# Patient Record
Sex: MALE | Race: WHITE | Employment: OTHER | URBAN - METROPOLITAN AREA
[De-identification: names, ages, dates, MRNs, and addresses within clinical notes are randomized per-mention and may not be internally consistent; named-entity substitution may affect disease eponyms.]

---

## 2019-02-01 ENCOUNTER — APPOINTMENT (OUTPATIENT)
Dept: GENERAL RADIOLOGY | Age: 69
End: 2019-02-01
Payer: MEDICARE

## 2019-02-01 ENCOUNTER — TELEPHONE (OUTPATIENT)
Dept: ORTHOPEDIC SURGERY | Age: 69
End: 2019-02-01

## 2019-02-01 ENCOUNTER — HOSPITAL ENCOUNTER (EMERGENCY)
Age: 69
Discharge: HOME | End: 2019-02-01
Attending: EMERGENCY MEDICINE
Payer: MEDICARE

## 2019-02-01 VITALS
HEART RATE: 81 BPM | BODY MASS INDEX: 25.77 KG/M2 | OXYGEN SATURATION: 96 % | RESPIRATION RATE: 14 BRPM | DIASTOLIC BLOOD PRESSURE: 78 MMHG | HEIGHT: 70 IN | WEIGHT: 180 LBS | TEMPERATURE: 97.7 F | SYSTOLIC BLOOD PRESSURE: 143 MMHG

## 2019-02-01 DIAGNOSIS — S52.501A CLOSED FRACTURE OF DISTAL ENDS OF RIGHT RADIUS AND ULNA, INITIAL ENCOUNTER: Primary | ICD-10-CM

## 2019-02-01 DIAGNOSIS — S52.601A CLOSED FRACTURE OF DISTAL ENDS OF RIGHT RADIUS AND ULNA, INITIAL ENCOUNTER: Primary | ICD-10-CM

## 2019-02-01 PROCEDURE — 99284 EMERGENCY DEPT VISIT MOD MDM: CPT

## 2019-02-01 PROCEDURE — 6360000002 HC RX W HCPCS

## 2019-02-01 PROCEDURE — 96372 THER/PROPH/DIAG INJ SC/IM: CPT

## 2019-02-01 PROCEDURE — 73130 X-RAY EXAM OF HAND: CPT

## 2019-02-01 PROCEDURE — 73100 X-RAY EXAM OF WRIST: CPT

## 2019-02-01 PROCEDURE — 25605 CLTX DST RDL FX/EPHYS SEP W/: CPT

## 2019-02-01 PROCEDURE — 73110 X-RAY EXAM OF WRIST: CPT

## 2019-02-01 PROCEDURE — 2500000003 HC RX 250 WO HCPCS: Performed by: STUDENT IN AN ORGANIZED HEALTH CARE EDUCATION/TRAINING PROGRAM

## 2019-02-01 RX ORDER — LIDOCAINE HYDROCHLORIDE 10 MG/ML
20 INJECTION, SOLUTION EPIDURAL; INFILTRATION; INTRACAUDAL; PERINEURAL ONCE
Status: COMPLETED | OUTPATIENT
Start: 2019-02-01 | End: 2019-02-01

## 2019-02-01 RX ORDER — LIDOCAINE HYDROCHLORIDE AND EPINEPHRINE 10; 10 MG/ML; UG/ML
INJECTION, SOLUTION INFILTRATION; PERINEURAL
Status: DISCONTINUED
Start: 2019-02-01 | End: 2019-02-01 | Stop reason: HOSPADM

## 2019-02-01 RX ORDER — FENTANYL CITRATE 50 UG/ML
INJECTION, SOLUTION INTRAMUSCULAR; INTRAVENOUS
Status: COMPLETED
Start: 2019-02-01 | End: 2019-02-01

## 2019-02-01 RX ORDER — LIDOCAINE HYDROCHLORIDE 10 MG/ML
INJECTION, SOLUTION INFILTRATION; PERINEURAL
Status: DISCONTINUED
Start: 2019-02-01 | End: 2019-02-01 | Stop reason: HOSPADM

## 2019-02-01 RX ORDER — FENTANYL CITRATE 50 UG/ML
100 INJECTION, SOLUTION INTRAMUSCULAR; INTRAVENOUS ONCE
Status: COMPLETED | OUTPATIENT
Start: 2019-02-01 | End: 2019-02-01

## 2019-02-01 RX ORDER — HYDROCODONE BITARTRATE AND ACETAMINOPHEN 5; 325 MG/1; MG/1
1 TABLET ORAL EVERY 6 HOURS PRN
Qty: 12 TABLET | Refills: 0 | Status: SHIPPED | OUTPATIENT
Start: 2019-02-01 | End: 2019-02-04

## 2019-02-01 RX ADMIN — FENTANYL CITRATE 100 MCG: 50 INJECTION, SOLUTION INTRAMUSCULAR; INTRAVENOUS at 12:28

## 2019-02-01 RX ADMIN — LIDOCAINE HYDROCHLORIDE 10 ML: 10 INJECTION, SOLUTION EPIDURAL; INFILTRATION; INTRACAUDAL; PERINEURAL at 12:22

## 2019-02-01 ASSESSMENT — PAIN SCALES - GENERAL
PAINLEVEL_OUTOF10: 10
PAINLEVEL_OUTOF10: 10

## 2019-02-01 ASSESSMENT — PAIN DESCRIPTION - ORIENTATION: ORIENTATION: RIGHT

## 2019-02-01 ASSESSMENT — PAIN DESCRIPTION - PAIN TYPE: TYPE: ACUTE PAIN

## 2019-02-01 ASSESSMENT — PAIN DESCRIPTION - LOCATION: LOCATION: ARM;WRIST

## 2019-06-21 ENCOUNTER — HOSPITAL ENCOUNTER (EMERGENCY)
Age: 69
Discharge: HOME | End: 2019-06-21
Attending: EMERGENCY MEDICINE
Payer: MEDICARE

## 2019-06-21 VITALS
HEART RATE: 75 BPM | HEIGHT: 69 IN | OXYGEN SATURATION: 99 % | WEIGHT: 178 LBS | DIASTOLIC BLOOD PRESSURE: 79 MMHG | TEMPERATURE: 98 F | RESPIRATION RATE: 16 BRPM | SYSTOLIC BLOOD PRESSURE: 135 MMHG | BODY MASS INDEX: 26.36 KG/M2

## 2019-06-21 DIAGNOSIS — M54.16 LUMBAR RADICULOPATHY, RIGHT: Primary | ICD-10-CM

## 2019-06-21 PROCEDURE — 6360000002 HC RX W HCPCS: Performed by: EMERGENCY MEDICINE

## 2019-06-21 PROCEDURE — 96374 THER/PROPH/DIAG INJ IV PUSH: CPT

## 2019-06-21 PROCEDURE — 99283 EMERGENCY DEPT VISIT LOW MDM: CPT

## 2019-06-21 PROCEDURE — 6370000000 HC RX 637 (ALT 250 FOR IP): Performed by: EMERGENCY MEDICINE

## 2019-06-21 PROCEDURE — 96375 TX/PRO/DX INJ NEW DRUG ADDON: CPT

## 2019-06-21 RX ORDER — BACLOFEN 10 MG/1
TABLET ORAL
Qty: 30 TABLET | Refills: 0 | Status: SHIPPED | OUTPATIENT
Start: 2019-06-21 | End: 2019-09-03 | Stop reason: ALTCHOICE

## 2019-06-21 RX ORDER — HYDROCODONE BITARTRATE AND ACETAMINOPHEN 5; 325 MG/1; MG/1
1 TABLET ORAL EVERY 6 HOURS PRN
Qty: 12 TABLET | Refills: 0 | Status: SHIPPED | OUTPATIENT
Start: 2019-06-21 | End: 2019-06-24

## 2019-06-21 RX ORDER — MORPHINE SULFATE 4 MG/ML
6 INJECTION, SOLUTION INTRAMUSCULAR; INTRAVENOUS ONCE
Status: COMPLETED | OUTPATIENT
Start: 2019-06-21 | End: 2019-06-21

## 2019-06-21 RX ORDER — MORPHINE SULFATE 4 MG/ML
6 INJECTION, SOLUTION INTRAMUSCULAR; INTRAVENOUS ONCE
Status: DISCONTINUED | OUTPATIENT
Start: 2019-06-21 | End: 2019-06-21

## 2019-06-21 RX ORDER — ONDANSETRON 4 MG/1
4 TABLET, ORALLY DISINTEGRATING ORAL ONCE
Status: COMPLETED | OUTPATIENT
Start: 2019-06-21 | End: 2019-06-21

## 2019-06-21 RX ORDER — KETOROLAC TROMETHAMINE 30 MG/ML
30 INJECTION, SOLUTION INTRAMUSCULAR; INTRAVENOUS ONCE
Status: COMPLETED | OUTPATIENT
Start: 2019-06-21 | End: 2019-06-21

## 2019-06-21 RX ORDER — DIAZEPAM 5 MG/1
5 TABLET ORAL ONCE
Status: COMPLETED | OUTPATIENT
Start: 2019-06-21 | End: 2019-06-21

## 2019-06-21 RX ADMIN — DIAZEPAM 5 MG: 5 TABLET ORAL at 04:53

## 2019-06-21 RX ADMIN — MORPHINE SULFATE 6 MG: 4 INJECTION, SOLUTION INTRAMUSCULAR; INTRAVENOUS at 04:54

## 2019-06-21 RX ADMIN — KETOROLAC TROMETHAMINE 30 MG: 30 INJECTION, SOLUTION INTRAMUSCULAR at 04:53

## 2019-06-21 RX ADMIN — ONDANSETRON 4 MG: 4 TABLET, ORALLY DISINTEGRATING ORAL at 04:53

## 2019-06-21 ASSESSMENT — PAIN DESCRIPTION - DESCRIPTORS: DESCRIPTORS: CONSTANT

## 2019-06-21 ASSESSMENT — PAIN SCALES - GENERAL
PAINLEVEL_OUTOF10: 10

## 2019-06-21 ASSESSMENT — PAIN DESCRIPTION - LOCATION: LOCATION: BACK

## 2019-06-21 ASSESSMENT — PAIN DESCRIPTION - PAIN TYPE: TYPE: ACUTE PAIN

## 2019-06-21 NOTE — ED PROVIDER NOTES
HPI:  6/21/19,   Time: 4:36 AM         Marc Mendiola is a 79 y.o. male presenting to the ED for right sided low back pain after bending over while mowing the lawn, beginning 11 days ago. The complaint has been constant, moderate in severity, and worsened by changing position. And movement of back. Also has mild pain going down the right leg to the knee. No saddle anesthesia and no bowel or bladder incontinence or retention and no fever or chills and no recent instrumentation or procedures    ROS:   Pertinent positives and negatives are stated within HPI, all other systems reviewed and are negative.  --------------------------------------------- PAST HISTORY ---------------------------------------------  Past Medical History:  has a past medical history of Anxiety, Hyperlipidemia, and Nephrolithiasis. Past Surgical History:  has no past surgical history on file. Social History:  reports that he has never smoked. He has never used smokeless tobacco. He reports that he does not drink alcohol. Family History: family history includes Cancer in his mother; Hypertension in his mother. The patients home medications have been reviewed. Allergies: Patient has no known allergies. -------------------------------------------------- RESULTS -------------------------------------------------  All laboratory and radiology results have been personally reviewed by myself   LABS:  No results found for this visit on 06/21/19. RADIOLOGY:  Interpreted by Radiologist.  No orders to display       ------------------------- NURSING NOTES AND VITALS REVIEWED ---------------------------   The nursing notes within the ED encounter and vital signs as below have been reviewed.    Pulse 78   Temp 98 °F (36.7 °C) (Oral)   Resp 16   Ht 5' 9\" (1.753 m)   Wt 178 lb (80.7 kg)   SpO2 99%   BMI 26.29 kg/m²   Oxygen Saturation Interpretation: Normal      ---------------------------------------------------PHYSICAL EXAM--------------------------------------      Constitutional/General: Alert and oriented x3, well appearing, non toxic in mild distress secondary to pain  Head: NC/AT  Eyes: PERRL, EOMI  Mouth: Oropharynx clear, handling secretions, no trismus  Neck: Supple, full ROM, no meningeal signs  Pulmonary: Lungs clear to auscultation bilaterally, no wheezes, rales, or rhonchi. Not in respiratory distress  Cardiovascular:  Regular rate and rhythm, no murmurs, gallops, or rubs. 2+ distal pulses  Abdomen: Soft, non tender, non distended,   Extremities: Moves all extremities x 4. Warm and well perfused  Skin: warm and dry without rash  Neurologic: GCS 15,  Psych: Normal Affect  Low back: Moderate LS paraspinal tenderness to palpation on the right but no clear spasm or deformity and no tenderness over the spinous processes    ------------------------------ ED COURSE/MEDICAL DECISION MAKING----------------------  Medications   ketorolac (TORADOL) injection 30 mg (has no administration in time range)   ondansetron (ZOFRAN-ODT) disintegrating tablet 4 mg (has no administration in time range)   diazepam (VALIUM) tablet 5 mg (has no administration in time range)   morphine sulfate (PF) injection 6 mg (has no administration in time range)         Medical Decision Making:    Suspected lumbar radiculopathy    Counseling: The emergency provider has spoken with the patient and discussed todays results, in addition to providing specific details for the plan of care and counseling regarding the diagnosis and prognosis. Questions are answered at this time and they are agreeable with the plan.      --------------------------------- IMPRESSION AND DISPOSITION ---------------------------------    IMPRESSION  1.  Lumbar radiculopathy, right Inadequately Controlled       DISPOSITION  Disposition: Discharge to home  Patient condition is stable                  Dread Batista MD  06/21/19 [id number]

## 2019-06-21 NOTE — ED NOTES
Pts and wife asking about Celebrex and Voltaren gel. Dr. Walker Araseli updated.      Norma Fraga, DEREK  06/21/19 [id number]

## 2021-02-28 ENCOUNTER — IMMUNIZATION (OUTPATIENT)
Dept: PRIMARY CARE CLINIC | Age: 71
End: 2021-02-28
Payer: MEDICARE

## 2021-02-28 PROCEDURE — 0001A COVID-19, PFIZER VACCINE 30MCG/0.3ML DOSE: CPT | Performed by: PHYSICIAN ASSISTANT

## 2021-02-28 PROCEDURE — 91300 COVID-19, PFIZER VACCINE 30MCG/0.3ML DOSE: CPT | Performed by: PHYSICIAN ASSISTANT

## 2021-03-24 ENCOUNTER — IMMUNIZATION (OUTPATIENT)
Dept: PRIMARY CARE CLINIC | Age: 71
End: 2021-03-24
Payer: MEDICARE

## 2021-03-24 PROCEDURE — 0002A PR IMM ADMN SARSCOV2 30MCG/0.3ML DIL RECON 2ND DOSE: CPT | Performed by: INTERNAL MEDICINE

## 2021-03-24 PROCEDURE — 91300 COVID-19, PFIZER VACCINE 30MCG/0.3ML DOSE: CPT | Performed by: INTERNAL MEDICINE

## 2021-07-06 ENCOUNTER — HOSPITAL ENCOUNTER (OUTPATIENT)
Age: 71
Discharge: HOME | End: 2021-07-08

## 2021-07-06 PROCEDURE — 88305 TISSUE EXAM BY PATHOLOGIST: CPT

## 2023-05-09 ENCOUNTER — HOSPITAL ENCOUNTER (OUTPATIENT)
Age: 73
Discharge: HOME | End: 2023-05-11

## 2023-06-20 ENCOUNTER — HOSPITAL ENCOUNTER (EMERGENCY)
Age: 73
Discharge: HOME | End: 2023-06-20
Attending: EMERGENCY MEDICINE
Payer: MEDICARE

## 2023-06-20 VITALS
SYSTOLIC BLOOD PRESSURE: 128 MMHG | HEART RATE: 80 BPM | HEIGHT: 67 IN | WEIGHT: 180 LBS | BODY MASS INDEX: 28.25 KG/M2 | RESPIRATION RATE: 18 BRPM | TEMPERATURE: 98.1 F | OXYGEN SATURATION: 99 % | DIASTOLIC BLOOD PRESSURE: 82 MMHG

## 2023-06-20 DIAGNOSIS — R33.9 URINARY RETENTION: Primary | ICD-10-CM

## 2023-06-20 DIAGNOSIS — N35.911 STRICTURE OF URETHRAL MEATUS IN MALE, UNSPECIFIED STRICTURE TYPE: ICD-10-CM

## 2023-06-20 LAB
BACTERIA URNS QL MICRO: ABNORMAL /HPF
BILIRUB UR QL STRIP: NEGATIVE
CLARITY UR: CLEAR
COLOR UR: YELLOW
GLUCOSE UR STRIP-MCNC: NEGATIVE MG/DL
HGB UR QL STRIP: ABNORMAL
KETONES UR STRIP-MCNC: NEGATIVE MG/DL
LEUKOCYTE ESTERASE UR QL STRIP: ABNORMAL
MUCOUS THREADS URNS QL MICRO: PRESENT /LPF
NITRITE UR QL STRIP: NEGATIVE
PH UR STRIP: 6.5 [PH] (ref 5–9)
PROT UR STRIP-MCNC: NEGATIVE MG/DL
RBC #/AREA URNS HPF: ABNORMAL /HPF (ref 0–2)
SP GR UR STRIP: 1.02 (ref 1–1.03)
UROBILINOGEN UR STRIP-ACNC: 0.2 E.U./DL
WBC #/AREA URNS HPF: ABNORMAL /HPF (ref 0–5)

## 2023-06-20 PROCEDURE — 6370000000 HC RX 637 (ALT 250 FOR IP): Performed by: NURSE PRACTITIONER

## 2023-06-20 PROCEDURE — 81001 URINALYSIS AUTO W/SCOPE: CPT

## 2023-06-20 PROCEDURE — 51702 INSERT TEMP BLADDER CATH: CPT

## 2023-06-20 PROCEDURE — 87088 URINE BACTERIA CULTURE: CPT

## 2023-06-20 PROCEDURE — 99283 EMERGENCY DEPT VISIT LOW MDM: CPT

## 2023-06-20 RX ORDER — LIDOCAINE HYDROCHLORIDE 20 MG/ML
JELLY TOPICAL PRN
Status: DISCONTINUED | OUTPATIENT
Start: 2023-06-20 | End: 2023-06-20 | Stop reason: HOSPADM

## 2023-06-20 RX ADMIN — LIDOCAINE HYDROCHLORIDE: 20 JELLY TOPICAL at 17:47

## 2023-06-20 ASSESSMENT — PAIN - FUNCTIONAL ASSESSMENT
PAIN_FUNCTIONAL_ASSESSMENT: 0-10
PAIN_FUNCTIONAL_ASSESSMENT: 0-10

## 2023-06-20 ASSESSMENT — PAIN DESCRIPTION - FREQUENCY: FREQUENCY: CONTINUOUS

## 2023-06-20 ASSESSMENT — PAIN SCALES - GENERAL
PAINLEVEL_OUTOF10: 5
PAINLEVEL_OUTOF10: 6

## 2023-06-20 ASSESSMENT — PAIN DESCRIPTION - LOCATION
LOCATION: ABDOMEN
LOCATION: PENIS

## 2023-06-20 ASSESSMENT — LIFESTYLE VARIABLES
HOW OFTEN DO YOU HAVE A DRINK CONTAINING ALCOHOL: NEVER
HOW MANY STANDARD DRINKS CONTAINING ALCOHOL DO YOU HAVE ON A TYPICAL DAY: PATIENT DOES NOT DRINK

## 2023-06-20 ASSESSMENT — PAIN DESCRIPTION - ORIENTATION: ORIENTATION: LOWER

## 2023-06-20 ASSESSMENT — PAIN DESCRIPTION - PAIN TYPE: TYPE: ACUTE PAIN

## 2023-06-20 ASSESSMENT — PAIN DESCRIPTION - DESCRIPTORS
DESCRIPTORS: ACHING
DESCRIPTORS: ACHING;BURNING

## 2023-06-20 NOTE — ED NOTES
Resident attempted to insert kendrick unsuccessful d/t stricture at the meatus, urology notified     Rosalino Oscar RN  06/20/23 0413

## 2023-06-20 NOTE — ED NOTES
Patient provided urine sample, states he had a turp done 6 weeks ago with Dr. Yumi Swift and has been having issues fully emptying bladder since. Now stream seems to be \"weird\" as well.       Chi Fox RN  06/20/23 [id number]

## 2023-06-20 NOTE — CONSULTS
6/20/2023 5:07 PM  Miguel Ángel Martin  [id number]     Chief Complaint:    Urinary retention, inability to place kendrick catheter      History of Present Illness: The patient is a 81 y.o. male patient who presented to the hospital with complaints of urinary retention and weak stream. Attempts to place a kendrick catheter in the ER were unsuccessful. He underwent a TURP on 5/9/23 with Dr. Alicia Mcallister. At the time of the TURP he was found to have very low grade, low volume prostate cancer which is being monitored. He states when he is urinating he feels there is an obstruction in his urethra and sometimes has to \"milk\" the urine out. Past Medical History:   Diagnosis Date    Anxiety     Hyperlipidemia     Nephrolithiasis          No past surgical history on file. Medications Prior to Admission:    Not in a hospital admission. Allergies:    Patient has no known allergies. Social History:    reports that he has never smoked. He has never used smokeless tobacco. He reports that he does not drink alcohol. Family History:   Non-contributory to this Urological problem  family history includes Cancer in his mother; Hypertension in his mother. Review of Systems:  Constitutional: No fever or chills   Respiratory: negative for cough and hemoptysis  Cardiovascular: negative for chest pain and dyspnea  Gastrointestinal: negative for abdominal pain, diarrhea, nausea and vomiting   Derm: negative for rash and skin lesion(s)  Neurological: negative for seizures and tremors  Musculoskeletal: Negative    Psychiatric: Negative   : As above in the HPI, otherwise negative  All other reviews are negative    Physical Exam:     Vitals:  BP (!) 145/91   Pulse 85   Temp 98.1 °F (36.7 °C) (Oral)   Resp 18   Ht 5' 7\" (1.702 m)   Wt 180 lb (81.6 kg)   SpO2 99%   BMI 28.19 kg/m²     General:  Awake, alert, oriented X 3. No apparent distress. HEENT:  Normocephalic, atraumatic.   Lungs:  Respirations symmetric and

## 2023-06-20 NOTE — ED NOTES
Attempted to apply leg bag patient refused. Instructed patient/wife on kendrick cath care, addressed all questions. Patient to follow up with urology in 7 days.       Cynthia Nick RN  06/20/23 1588

## 2023-06-20 NOTE — ED PROVIDER NOTES
[facility] ENCOUNTER        Pt Name: Audrea Mcburney  MRN: [id number]  [place] 1950  Date of evaluation: 6/20/2023  Provider: Carloz Bah DO  PCP: Angel Olmstead MD  Note Started: 3:13 PM EDT 6/20/23    CHIEF COMPLAINT       Chief Complaint   Patient presents with    Urinary Retention     Pt states unable to urinate for about 4 hours now, pt states feels like he has to go and tries but nothing happens        HISTORY OF PRESENT ILLNESS: 1 or more Elements   History From: Patient    Limitations to history : None    Audrea Mcburney is a 81 y.o. male who presents to the emergency department with chief complaint of urinary retention. Patient states that he had a transurethral resection of the prostate with Dr. Rosaura Roth approximately 6 weeks ago for BPH and since then he has been urinating more but he has been having some dysuria. He states that over the past several hours he has been having the urge to urinate but has been having a difficult time doing so. He states that he called the urologist office and they stated that there was no one in the office to see him today and that they could see him tomorrow and that if he was comfortable with waiting until and that he should come to the emergency department for evaluation. Patient states that he has been able to pee he just feels that there is a sensation of obstruction in his urethra. Otherwise patient denies any fever, chills, nausea, vomiting, chest pain, shortness of breath, abdominal pain, hematuria, constipation or diarrhea. Nursing Notes were all reviewed and agreed with or any disagreements were addressed in the HPI. REVIEW OF SYSTEMS :      Positives and Pertinent negatives as per HPI. PAST MEDICAL HISTORY/Chronic Conditions Affecting Care      has a past medical history of Anxiety, Hyperlipidemia, and Nephrolithiasis.      SURGICAL HISTORY   No past surgical

## 2023-06-23 LAB — BACTERIA UR CULT: NORMAL

## 2023-07-24 LAB
ANION GAP IN SER/PLAS: 13 MMOL/L (ref 10–20)
CALCIUM (MG/DL) IN SER/PLAS: 9.1 MG/DL (ref 8.6–10.3)
CARBON DIOXIDE, TOTAL (MMOL/L) IN SER/PLAS: 27 MMOL/L (ref 21–32)
CHLORIDE (MMOL/L) IN SER/PLAS: 102 MMOL/L (ref 98–107)
CREATININE (MG/DL) IN SER/PLAS: 0.8 MG/DL (ref 0.5–1.3)
GFR MALE: >90 ML/MIN/1.73M2
GLUCOSE (MG/DL) IN SER/PLAS: 93 MG/DL (ref 74–99)
POTASSIUM (MMOL/L) IN SER/PLAS: 4 MMOL/L (ref 3.5–5.3)
SODIUM (MMOL/L) IN SER/PLAS: 138 MMOL/L (ref 136–145)
UREA NITROGEN (MG/DL) IN SER/PLAS: 10 MG/DL (ref 6–23)

## 2023-09-14 ENCOUNTER — HOSPITAL ENCOUNTER (OUTPATIENT)
Dept: DATA CONVERSION | Facility: HOSPITAL | Age: 73
End: 2023-09-14
Attending: STUDENT IN AN ORGANIZED HEALTH CARE EDUCATION/TRAINING PROGRAM | Admitting: STUDENT IN AN ORGANIZED HEALTH CARE EDUCATION/TRAINING PROGRAM

## 2023-09-14 DIAGNOSIS — N35.919 UNSPECIFIED URETHRAL STRICTURE, MALE, UNSPECIFIED SITE: ICD-10-CM

## 2023-09-14 DIAGNOSIS — N35.914 UNSPECIFIED ANTERIOR URETHRAL STRICTURE, MALE: ICD-10-CM

## 2023-09-29 VITALS — WEIGHT: 156.53 LBS | HEIGHT: 70 IN | BODY MASS INDEX: 22.41 KG/M2

## 2023-09-30 NOTE — H&P
History & Physical Reviewed:   I have reviewed the History and Physical dated:  14-Sep-2023   History and Physical reviewed and relevant findings noted. Patient examined to review pertinent physical  findings.: No significant changes   Home Medications Reviewed: no changes noted   Allergies Reviewed: no changes noted       ERAS (Enhanced Recovery After Surgery):  ·  ERAS Patient: no     Consent:   COVID-19 Consent:  ·  COVID-19 Risk Consent Surgeon has reviewed key risks related to the risk of magda COVID-19 and if they contract COVID-19 what the risks are.     Attestation:   Note Completion:  I am a:  Resident/Fellow   Attending Attestation I saw and evaluated the patient.  I personally obtained the key and critical portions of the history and physical exam or was physically present for key and  critical portions performed by the resident/fellow. I reviewed the resident/fellow?s documentation and discussed the patient with the resident/fellow.  I agree with the resident/fellow?s medical decision making as documented in the note.     I personally evaluated the patient on 14-Sep-2023         Electronic Signatures:  Gus Orozco (Resident))  (Signed 14-Sep-2023 06:42)   Authored: History & Physical Reviewed, ERAS, Consent,  Note Completion  Rubens Mckeon)  (Signed 14-Sep-2023 10:39)   Authored: Note Completion   Co-Signer: History & Physical Reviewed, ERAS, Consent, Note Completion      Last Updated: 14-Sep-2023 10:39 by Rubens Mckeon)    Belia Ayala

## 2023-10-01 NOTE — OP NOTE
PROCEDURE DETAILS    Preoperative Diagnosis:  Urethral stricture  Postoperative Diagnosis:  Urethral stricture  Surgeon: Rubens Mckeon  Resident/Fellow/Other Assistant: Gus Orozco    Procedure:  Distal urethroplasty, penile block, flexible cystoscopy   Estimated Blood Loss: 1mL  Findings: Thin Distal urethral stricture of the fossa navicularis was incised with urethral advancement open to 22 Fr  Specimens(s) Collected: no,     Patient Returned To/Condition: PACU        Operative Report:   OPERATIVE INDICATIONS:  Fossa navicularis stricture     OPERATIVE DETAILS:  The patient was brought to the operating suite, laid supine on the operating table.  A time-out was taken.  He was anesthetized and orotracheally intubated.  He was prepped and draped in standard sterile fashion.  A glans stitch was placed for retraction.  A penile block was performed with 50% marcaine and lidocaine mixed. The meatus was found to be patent but just inside was nearly obliterated. The urethra was cannulated with a glidewire. A ventral incision was performed with a needle tip bovie. This   was carried down to the proximal glans until a bougie catheter 22fr in size was able to be advanced without resistance. The urethral mucosa was advanced and secured to the glans skin edge with interrupted 4-0 vicryl sutures. The frenulum was incised  and reapproximated with 4-0 vicryl. The lateral glans incisions were closed with 4-0 Vicryl interrupted sutures interrupted.      At this point in time, the drapes were removed.  The patient was awakened and transferred to the PACU in a stable condition.                        Attestation:   Note Completion:  Attending Attestation I was present for key portions of the procedure and the procedure lasted longer than 5 minutes.          Electronic Signatures:  Gus Orozco (MD (Resident))  (Signed 14-Sep-2023 09:43)   Authored: Post-Operative Note, Chart Review  Rubens Mckeon)  (Signed 14-Sep-2023  10:45)   Authored: Post-Operative Note, Chart Review, Note Completion   Co-Signer: Post-Operative Note, Chart Review      Last Updated: 14-Sep-2023 10:45 by Rubens Mckeon)

## 2023-10-10 ENCOUNTER — APPOINTMENT (OUTPATIENT)
Dept: PREADMISSION TESTING | Facility: HOSPITAL | Age: 73
End: 2023-10-10
Payer: MEDICARE

## 2023-10-16 ENCOUNTER — ANESTHESIA EVENT (OUTPATIENT)
Dept: OPERATING ROOM | Facility: HOSPITAL | Age: 73
End: 2023-10-16
Payer: MEDICARE

## 2023-10-17 ENCOUNTER — ANESTHESIA (OUTPATIENT)
Dept: OPERATING ROOM | Facility: HOSPITAL | Age: 73
End: 2023-10-17
Payer: MEDICARE

## 2023-10-17 ENCOUNTER — HOSPITAL ENCOUNTER (OUTPATIENT)
Facility: HOSPITAL | Age: 73
Setting detail: OUTPATIENT SURGERY
Discharge: HOME | End: 2023-10-17
Attending: STUDENT IN AN ORGANIZED HEALTH CARE EDUCATION/TRAINING PROGRAM | Admitting: STUDENT IN AN ORGANIZED HEALTH CARE EDUCATION/TRAINING PROGRAM
Payer: MEDICARE

## 2023-10-17 VITALS
HEIGHT: 70 IN | RESPIRATION RATE: 14 BRPM | OXYGEN SATURATION: 98 % | WEIGHT: 161.38 LBS | SYSTOLIC BLOOD PRESSURE: 124 MMHG | BODY MASS INDEX: 23.1 KG/M2 | TEMPERATURE: 97.5 F | DIASTOLIC BLOOD PRESSURE: 83 MMHG | HEART RATE: 72 BPM

## 2023-10-17 DIAGNOSIS — R97.20 ELEVATED PSA: ICD-10-CM

## 2023-10-17 PROBLEM — F32.A DEPRESSION: Status: ACTIVE | Noted: 2023-10-17

## 2023-10-17 PROBLEM — E78.49 OTHER HYPERLIPIDEMIA: Status: ACTIVE | Noted: 2023-10-17

## 2023-10-17 PROBLEM — F41.9 ANXIETY: Status: ACTIVE | Noted: 2023-10-17

## 2023-10-17 PROCEDURE — A55700 PR BIOPSY OF PROSTATE,NEEDLE/PUNCH: Performed by: ANESTHESIOLOGY

## 2023-10-17 PROCEDURE — A55700 PR BIOPSY OF PROSTATE,NEEDLE/PUNCH: Performed by: ANESTHESIOLOGIST ASSISTANT

## 2023-10-17 PROCEDURE — 7100000010 HC PHASE TWO TIME - EACH INCREMENTAL 1 MINUTE: Performed by: STUDENT IN AN ORGANIZED HEALTH CARE EDUCATION/TRAINING PROGRAM

## 2023-10-17 PROCEDURE — 88344 IMHCHEM/IMCYTCHM EA MLT ANTB: CPT | Performed by: PATHOLOGY

## 2023-10-17 PROCEDURE — 3600000007 HC OR TIME - EACH INCREMENTAL 1 MINUTE - PROCEDURE LEVEL TWO: Performed by: STUDENT IN AN ORGANIZED HEALTH CARE EDUCATION/TRAINING PROGRAM

## 2023-10-17 PROCEDURE — 2500000004 HC RX 250 GENERAL PHARMACY W/ HCPCS (ALT 636 FOR OP/ED): Performed by: STUDENT IN AN ORGANIZED HEALTH CARE EDUCATION/TRAINING PROGRAM

## 2023-10-17 PROCEDURE — 3700000001 HC GENERAL ANESTHESIA TIME - INITIAL BASE CHARGE: Performed by: STUDENT IN AN ORGANIZED HEALTH CARE EDUCATION/TRAINING PROGRAM

## 2023-10-17 PROCEDURE — 88344 IMHCHEM/IMCYTCHM EA MLT ANTB: CPT | Mod: TC,SUR,BEALAB | Performed by: STUDENT IN AN ORGANIZED HEALTH CARE EDUCATION/TRAINING PROGRAM

## 2023-10-17 PROCEDURE — 3700000002 HC GENERAL ANESTHESIA TIME - EACH INCREMENTAL 1 MINUTE: Performed by: STUDENT IN AN ORGANIZED HEALTH CARE EDUCATION/TRAINING PROGRAM

## 2023-10-17 PROCEDURE — 3600000002 HC OR TIME - INITIAL BASE CHARGE - PROCEDURE LEVEL TWO: Performed by: STUDENT IN AN ORGANIZED HEALTH CARE EDUCATION/TRAINING PROGRAM

## 2023-10-17 PROCEDURE — 2720000007 HC OR 272 NO HCPCS: Performed by: STUDENT IN AN ORGANIZED HEALTH CARE EDUCATION/TRAINING PROGRAM

## 2023-10-17 PROCEDURE — 2580000001 HC RX 258 IV SOLUTIONS: Performed by: ANESTHESIOLOGY

## 2023-10-17 PROCEDURE — 7100000002 HC RECOVERY ROOM TIME - EACH INCREMENTAL 1 MINUTE: Performed by: STUDENT IN AN ORGANIZED HEALTH CARE EDUCATION/TRAINING PROGRAM

## 2023-10-17 PROCEDURE — 76872 US TRANSRECTAL: CPT | Performed by: STUDENT IN AN ORGANIZED HEALTH CARE EDUCATION/TRAINING PROGRAM

## 2023-10-17 PROCEDURE — 2500000004 HC RX 250 GENERAL PHARMACY W/ HCPCS (ALT 636 FOR OP/ED): Performed by: ANESTHESIOLOGIST ASSISTANT

## 2023-10-17 PROCEDURE — 99100 ANES PT EXTEME AGE<1 YR&>70: CPT | Performed by: ANESTHESIOLOGY

## 2023-10-17 PROCEDURE — 55700 PR PROSTATE NEEDLE BIOPSY ANY APPROACH: CPT | Performed by: STUDENT IN AN ORGANIZED HEALTH CARE EDUCATION/TRAINING PROGRAM

## 2023-10-17 PROCEDURE — 7100000009 HC PHASE TWO TIME - INITIAL BASE CHARGE: Performed by: STUDENT IN AN ORGANIZED HEALTH CARE EDUCATION/TRAINING PROGRAM

## 2023-10-17 PROCEDURE — 7100000001 HC RECOVERY ROOM TIME - INITIAL BASE CHARGE: Performed by: STUDENT IN AN ORGANIZED HEALTH CARE EDUCATION/TRAINING PROGRAM

## 2023-10-17 RX ORDER — CEFTRIAXONE 2 G/50ML
2 INJECTION, SOLUTION INTRAVENOUS ONCE
Status: COMPLETED | OUTPATIENT
Start: 2023-10-17 | End: 2023-10-17

## 2023-10-17 RX ORDER — HYDROMORPHONE HYDROCHLORIDE 1 MG/ML
1 INJECTION, SOLUTION INTRAMUSCULAR; INTRAVENOUS; SUBCUTANEOUS EVERY 5 MIN PRN
Status: DISCONTINUED | OUTPATIENT
Start: 2023-10-17 | End: 2023-10-17 | Stop reason: HOSPADM

## 2023-10-17 RX ORDER — ONDANSETRON HYDROCHLORIDE 2 MG/ML
INJECTION, SOLUTION INTRAVENOUS AS NEEDED
Status: DISCONTINUED | OUTPATIENT
Start: 2023-10-17 | End: 2023-10-17

## 2023-10-17 RX ORDER — KETOROLAC TROMETHAMINE 30 MG/ML
INJECTION, SOLUTION INTRAMUSCULAR; INTRAVENOUS AS NEEDED
Status: DISCONTINUED | OUTPATIENT
Start: 2023-10-17 | End: 2023-10-17

## 2023-10-17 RX ORDER — LABETALOL HYDROCHLORIDE 5 MG/ML
5 INJECTION, SOLUTION INTRAVENOUS ONCE AS NEEDED
Status: DISCONTINUED | OUTPATIENT
Start: 2023-10-17 | End: 2023-10-17 | Stop reason: HOSPADM

## 2023-10-17 RX ORDER — ATORVASTATIN CALCIUM 20 MG/1
20 TABLET, FILM COATED ORAL DAILY
COMMUNITY

## 2023-10-17 RX ORDER — PROPOFOL 10 MG/ML
INJECTION, EMULSION INTRAVENOUS AS NEEDED
Status: DISCONTINUED | OUTPATIENT
Start: 2023-10-17 | End: 2023-10-17

## 2023-10-17 RX ORDER — PROPOFOL 10 MG/ML
INJECTION, EMULSION INTRAVENOUS CONTINUOUS PRN
Status: DISCONTINUED | OUTPATIENT
Start: 2023-10-17 | End: 2023-10-17

## 2023-10-17 RX ORDER — BUPIVACAINE HYDROCHLORIDE 2.5 MG/ML
INJECTION, SOLUTION EPIDURAL; INFILTRATION; INTRACAUDAL AS NEEDED
Status: DISCONTINUED | OUTPATIENT
Start: 2023-10-17 | End: 2023-10-17 | Stop reason: HOSPADM

## 2023-10-17 RX ORDER — SODIUM CHLORIDE, SODIUM LACTATE, POTASSIUM CHLORIDE, CALCIUM CHLORIDE 600; 310; 30; 20 MG/100ML; MG/100ML; MG/100ML; MG/100ML
50 INJECTION, SOLUTION INTRAVENOUS CONTINUOUS
Status: DISCONTINUED | OUTPATIENT
Start: 2023-10-17 | End: 2023-10-17 | Stop reason: HOSPADM

## 2023-10-17 RX ORDER — ONDANSETRON HYDROCHLORIDE 2 MG/ML
4 INJECTION, SOLUTION INTRAVENOUS ONCE AS NEEDED
Status: DISCONTINUED | OUTPATIENT
Start: 2023-10-17 | End: 2023-10-17 | Stop reason: HOSPADM

## 2023-10-17 RX ORDER — VENLAFAXINE HYDROCHLORIDE 75 MG/1
75 CAPSULE, EXTENDED RELEASE ORAL DAILY
COMMUNITY
Start: 2023-08-15

## 2023-10-17 RX ORDER — FENTANYL CITRATE 50 UG/ML
INJECTION, SOLUTION INTRAMUSCULAR; INTRAVENOUS AS NEEDED
Status: DISCONTINUED | OUTPATIENT
Start: 2023-10-17 | End: 2023-10-17

## 2023-10-17 RX ORDER — MIDAZOLAM HYDROCHLORIDE 1 MG/ML
INJECTION, SOLUTION INTRAMUSCULAR; INTRAVENOUS AS NEEDED
Status: DISCONTINUED | OUTPATIENT
Start: 2023-10-17 | End: 2023-10-17

## 2023-10-17 RX ORDER — HYDRALAZINE HYDROCHLORIDE 20 MG/ML
5 INJECTION INTRAMUSCULAR; INTRAVENOUS EVERY 30 MIN PRN
Status: DISCONTINUED | OUTPATIENT
Start: 2023-10-17 | End: 2023-10-17 | Stop reason: HOSPADM

## 2023-10-17 RX ADMIN — KETOROLAC TROMETHAMINE 30 MG: 30 INJECTION, SOLUTION INTRAMUSCULAR; INTRAVENOUS at 12:57

## 2023-10-17 RX ADMIN — ONDANSETRON 4 MG: 2 INJECTION, SOLUTION INTRAMUSCULAR; INTRAVENOUS at 13:14

## 2023-10-17 RX ADMIN — MIDAZOLAM 2 MG: 1 INJECTION INTRAMUSCULAR; INTRAVENOUS at 12:49

## 2023-10-17 RX ADMIN — PROPOFOL 40 MG: 10 INJECTION, EMULSION INTRAVENOUS at 12:55

## 2023-10-17 RX ADMIN — SODIUM CHLORIDE, POTASSIUM CHLORIDE, SODIUM LACTATE AND CALCIUM CHLORIDE 50 ML/HR: 600; 310; 30; 20 INJECTION, SOLUTION INTRAVENOUS at 12:30

## 2023-10-17 RX ADMIN — CEFTRIAXONE SODIUM 2 G: 2 INJECTION, SOLUTION INTRAVENOUS at 12:56

## 2023-10-17 RX ADMIN — FENTANYL CITRATE 25 MCG: 50 INJECTION INTRAMUSCULAR; INTRAVENOUS at 13:04

## 2023-10-17 RX ADMIN — PROPOFOL 75 MCG/KG/MIN: 10 INJECTION, EMULSION INTRAVENOUS at 12:55

## 2023-10-17 RX ADMIN — FENTANYL CITRATE 25 MCG: 50 INJECTION INTRAMUSCULAR; INTRAVENOUS at 12:56

## 2023-10-17 RX ADMIN — DEXAMETHASONE SODIUM PHOSPHATE 4 MG: 4 INJECTION, SOLUTION INTRAMUSCULAR; INTRAVENOUS at 12:57

## 2023-10-17 RX ADMIN — FENTANYL CITRATE 25 MCG: 50 INJECTION INTRAMUSCULAR; INTRAVENOUS at 12:49

## 2023-10-17 SDOH — HEALTH STABILITY: MENTAL HEALTH: CURRENT SMOKER: 0

## 2023-10-17 ASSESSMENT — PAIN - FUNCTIONAL ASSESSMENT
PAIN_FUNCTIONAL_ASSESSMENT: 0-10
PAIN_FUNCTIONAL_ASSESSMENT: 0-10

## 2023-10-17 ASSESSMENT — COLUMBIA-SUICIDE SEVERITY RATING SCALE - C-SSRS
1. IN THE PAST MONTH, HAVE YOU WISHED YOU WERE DEAD OR WISHED YOU COULD GO TO SLEEP AND NOT WAKE UP?: NO
6. HAVE YOU EVER DONE ANYTHING, STARTED TO DO ANYTHING, OR PREPARED TO DO ANYTHING TO END YOUR LIFE?: NO
2. HAVE YOU ACTUALLY HAD ANY THOUGHTS OF KILLING YOURSELF?: NO

## 2023-10-17 ASSESSMENT — PAIN SCALES - GENERAL
PAINLEVEL_OUTOF10: 0 - NO PAIN
PAINLEVEL_OUTOF10: 0 - NO PAIN

## 2023-10-17 NOTE — H&P
"History Of Present Illness  Reji Lopez is a 73 y.o. male presenting with prostate cancer     Past Medical History  History reviewed. No pertinent past medical history.    Surgical History  History reviewed. No pertinent surgical history.     Social History  He reports that he has never smoked. He has never used smokeless tobacco. He reports that he does not drink alcohol and does not use drugs.    Family History  No family history on file.     Allergies  Patient has no known allergies.    Review of Systems   All other systems reviewed and are negative.       Physical Exam  Constitutional:       Appearance: Normal appearance.   HENT:      Head: Normocephalic and atraumatic.   Eyes:      Pupils: Pupils are equal, round, and reactive to light.   Pulmonary:      Effort: Pulmonary effort is normal.   Abdominal:      Palpations: Abdomen is soft.   Musculoskeletal:         General: Normal range of motion.   Skin:     General: Skin is warm.   Neurological:      Mental Status: He is alert.   Psychiatric:         Mood and Affect: Mood normal.          Last Recorded Vitals  Blood pressure 142/78, pulse 79, temperature 36.4 °C (97.5 °F), resp. rate 16, height 1.78 m (5' 10.08\"), weight 73.2 kg (161 lb 6 oz), SpO2 100 %.    Relevant Results             Assessment/Plan   Active Problems:    Depression    Other hyperlipidemia    Anxiety      biopsy       I spent  minutes in the professional and overall care of this patient.      Alec Forman MD    "

## 2023-10-17 NOTE — ANESTHESIA PREPROCEDURE EVALUATION
Patient: Reji Lopez    Procedure Information       Date/Time: 10/17/23 1115    Procedure: TRANSPERINEAL FUSION PROSTATE BIOPSY    Location: [facility] / [facility]    Surgeons: Alec Forman MD            Relevant Problems   Cardiovascular   (+) Other hyperlipidemia      Neuro/Psych   (+) Anxiety   (+) Depression       Surgical history  Urethral stricture    Clinical information reviewed:   Tobacco  Allergies  Meds   Med Hx  Surg Hx   Fam Hx  Soc Hx        NPO Detail:  NPO/Void Status  Date of Last Liquid: 10/17/23  Time of Last Liquid: 0600  Date of Last Solid: 10/16/23  Time of Last Solid: 1900  Time of Last Void: 0900         Physical Exam    Airway  Mallampati: II  TM distance: >3 FB  Neck ROM: full     Cardiovascular   Comments: deferred   Dental    Pulmonary   Comments: deferred   Abdominal     Comments: deferred         Anesthesia Plan    ASA 2     general and MAC     The patient is not a current smoker.    intravenous induction   Anesthetic plan and risks discussed with patient and spouse.    Plan discussed with [facility].

## 2023-10-17 NOTE — ANESTHESIA POSTPROCEDURE EVALUATION
Patient: Reji Lopez    Procedure Summary       Date: 10/17/23 Room / Location: [facility]    Anesthesia Start: 1251 Anesthesia Stop: 1325    Procedure: TRANSPERINEAL FUSION PROSTATE BIOPSY Diagnosis:     Surgeons: Alec Forman MD Responsible Provider: Salas Miller MD    Anesthesia Type: general, MAC ASA Status: 2            Anesthesia Type: general, MAC    Vitals Value Taken Time   /75 10/17/23 1335   Temp 36 °C (96.8 °F) 10/17/23 1321   Pulse 76 10/17/23 1335   Resp 20 10/17/23 1345   SpO2 97 % 10/17/23 1335       Anesthesia Post Evaluation    Patient location during evaluation: PACU  Patient participation: complete - patient participated  Level of consciousness: awake and alert  Pain management: adequate  Multimodal analgesia pain management approach  Airway patency: patent  Two or more strategies used to mitigate risk of obstructive sleep apnea  Cardiovascular status: acceptable and blood pressure returned to baseline  Respiratory status: acceptable  Hydration status: acceptable        There were no known notable events for this encounter.

## 2023-10-17 NOTE — OP NOTE
TRANSPERINEAL FUSION PROSTATE BIOPSY Operative Note     Date: 10/17/2023  OR Location: [facility]    Name: Reji Lopez, : 1950, Age: 73 y.o., MRN: [id number], Sex: male    Diagnosis  * No Diagnosis Codes entered * * No Diagnosis Codes entered *     Procedures  TRANSPERINEAL FUSION PROSTATE BIOPSY  82926 - SD PROSTATE NEEDLE BIOPSY ANY APPROACH      Surgeons      * Alec Forman - Primary    Resident/Fellow/Other Assistant:  No surgical staff documented.        Estimated Blood Loss (ml)  5.   Specimen(s) Removed  Removed region of interest and systematic biopsies.   Drain(s)  None.   Implant(s)  None.   Complications  None.   Indications (History)  Elevated PSA.   Findings of Procedure  30g prostate, aruna hypoechoic   Description of Procedure  After administration of anesthesia, a prostate block was performed and transrectal ultrasound. Transperineal biopsies taken from region of interest and systematic template performed using the precision point device.

## 2023-10-17 NOTE — PERIOPERATIVE NURSING NOTE
Pt ambulates to the bathroom.  Aware surgery will be delayed possibly 1-1.5 hours.  IVF with approx 300mL infused at this time.

## 2023-10-18 ASSESSMENT — PAIN SCALES - GENERAL: PAINLEVEL_OUTOF10: 0 - NO PAIN

## 2023-10-23 PROBLEM — N40.1 BPH WITH OBSTRUCTION/LOWER URINARY TRACT SYMPTOMS: Status: ACTIVE | Noted: 2023-10-23

## 2023-10-23 PROBLEM — N35.919 URETHRAL STRICTURE: Status: ACTIVE | Noted: 2023-10-23

## 2023-10-23 PROBLEM — N13.8 BPH WITH OBSTRUCTION/LOWER URINARY TRACT SYMPTOMS: Status: ACTIVE | Noted: 2023-10-23

## 2023-10-23 PROBLEM — C61 PROSTATE CANCER (MULTI): Status: ACTIVE | Noted: 2023-10-23

## 2023-10-23 RX ORDER — CHOLECALCIFEROL (VITAMIN D3) 125 MCG
220 CAPSULE ORAL NIGHTLY
COMMUNITY
End: 2024-02-22 | Stop reason: WASHOUT

## 2023-10-23 RX ORDER — IBUPROFEN 800 MG/1
800 TABLET ORAL EVERY 8 HOURS PRN
COMMUNITY
Start: 2023-06-15 | End: 2024-02-22 | Stop reason: WASHOUT

## 2023-10-23 RX ORDER — VENLAFAXINE 37.5 MG/1
37.5 TABLET ORAL
COMMUNITY
Start: 2019-10-14 | End: 2024-02-22 | Stop reason: WASHOUT

## 2023-10-25 ENCOUNTER — OFFICE VISIT (OUTPATIENT)
Dept: UROLOGY | Facility: CLINIC | Age: 73
End: 2023-10-25
Payer: MEDICARE

## 2023-10-25 VITALS
BODY MASS INDEX: 23.62 KG/M2 | HEART RATE: 79 BPM | DIASTOLIC BLOOD PRESSURE: 76 MMHG | WEIGHT: 165 LBS | HEIGHT: 70 IN | TEMPERATURE: 96.5 F | SYSTOLIC BLOOD PRESSURE: 132 MMHG

## 2023-10-25 DIAGNOSIS — R39.12 WEAK URINARY STREAM: Primary | ICD-10-CM

## 2023-10-25 DIAGNOSIS — N99.110 POSTPROCEDURAL MALE URETHRAL MEATAL STRICTURE: ICD-10-CM

## 2023-10-25 LAB
POC APPEARANCE, URINE: CLEAR
POC BILIRUBIN, URINE: NEGATIVE
POC BLOOD, URINE: ABNORMAL
POC COLOR, URINE: YELLOW
POC GLUCOSE, URINE: NEGATIVE MG/DL
POC KETONES, URINE: NEGATIVE MG/DL
POC LEUKOCYTES, URINE: NEGATIVE
POC NITRITE,URINE: NEGATIVE
POC PH, URINE: 7.5 PH
POC PROTEIN, URINE: NEGATIVE MG/DL
POC SPECIFIC GRAVITY, URINE: 1.02
POC UROBILINOGEN, URINE: 0.2 EU/DL

## 2023-10-25 PROCEDURE — 1160F RVW MEDS BY RX/DR IN RCRD: CPT | Performed by: STUDENT IN AN ORGANIZED HEALTH CARE EDUCATION/TRAINING PROGRAM

## 2023-10-25 PROCEDURE — 51736 URINE FLOW MEASUREMENT: CPT | Performed by: STUDENT IN AN ORGANIZED HEALTH CARE EDUCATION/TRAINING PROGRAM

## 2023-10-25 PROCEDURE — 1159F MED LIST DOCD IN RCRD: CPT | Performed by: STUDENT IN AN ORGANIZED HEALTH CARE EDUCATION/TRAINING PROGRAM

## 2023-10-25 PROCEDURE — 81003 URINALYSIS AUTO W/O SCOPE: CPT | Performed by: STUDENT IN AN ORGANIZED HEALTH CARE EDUCATION/TRAINING PROGRAM

## 2023-10-25 PROCEDURE — 1036F TOBACCO NON-USER: CPT | Performed by: STUDENT IN AN ORGANIZED HEALTH CARE EDUCATION/TRAINING PROGRAM

## 2023-10-25 PROCEDURE — 99024 POSTOP FOLLOW-UP VISIT: CPT | Performed by: STUDENT IN AN ORGANIZED HEALTH CARE EDUCATION/TRAINING PROGRAM

## 2023-10-25 PROCEDURE — 1126F AMNT PAIN NOTED NONE PRSNT: CPT | Performed by: STUDENT IN AN ORGANIZED HEALTH CARE EDUCATION/TRAINING PROGRAM

## 2023-10-25 NOTE — PROGRESS NOTES
Reji presents for a follow up evaluation.  The patient’s EMR has been reviewed.  Lives in [place], [place]  Previously evaluated by Dr. Carrasquillo and Dr. Forman.    Hx of bladder stones and BPH w/ LUTS.  S/p TURP and cystolitholapaxy (May 9th, 2023).  Post-operatively c/b meatal stenosis.  S/p dilation, however c/o persistent slowing, spraying and leakage.  Initially referred to me for management of his obstructive uropathy.  S/p distal urethroplasty with me (08/09/23). See below.    Hx of incidental Elverta 6 prostate cancer on TURP specimen.  Since evaluated by Dr. Carrasquillo whom reviewed his latest prostate MRI.  Prostate MRI (09/08/23) showed a 7.8g prostate and PI-RADS 4 and PI-RADS 2.   No pelvic lymphadenopathy or extraprostatic extension.  Patient was referred to Dr. Forman for biopsy.  S/p prostate biopsy (10/17/23).   Pathology pending.    SUBJECTIVE: HPI   TODAY (10/25/23)  Doing well overall s/p recent prostate biopsy with Dr. Forman (10/17/23).  Pathology results still pending. Sutures recently removed.  Pain has been well-controlled with no significant redness, swelling or discharge.    Albeit, c/o persistent sensation of incomplete emptying.  Associated with mild dysuria (burning) at the end of his voids.  Otherwise his stream has remained stable since last dilation.  Denies any significant straining to void.     States he voided shortly before arriving today, hence uroflow results.  Otherwise denies any recent UTIs, gross hematuria, fevers or chills.    Uroflow results:  Qmax: 4 mL/s,  VV: 42 mL, suboptimal    TO REVIEW: Last evaluation (08/09/23)  Presents today for cysto//RUG +/- dilation.  A glidewire was passed and we sequentially dilated the meatus with S-curve dilators. Dilated to 20 Fr, meatus only.    Cystoscopy demonstrated:  Urethra: normal in course and caliber with no evidence of other stricture or lesion.  Prostate: nonobstructing.  Bladder: normal capacity, no trabeculation, no diverticulum, no  stone, tumors, or other lesions.     RUG performed, meatus allowed the adaptor tip easily.  Contrast was instilled per urethra with fluoroscopic spot images obtained  The urethra was normal course and caliber until contrast abruptly did not pass further beyond the level of the sphincter  Contrast did not make it into the bladder, but we later confirmed there was no stricture here on cystoscopy    TO REVIEW: Initial evaluation (07/24/23)  Reports that about 5 weeks after TURP.  He developed gradual recurrence of his obstructive symptoms.  More recently, followed up at ED (06/20/23) for acute retention.  Requiring Rangel cath placement for a week.  Since removed, and able to void independently.  However his stream has been gradually weakening since.  WIth occasional leakage, wears precautionary pads.   Otherwise denies any recent UTIs, gross hematuria, flank pain, fevers or chills.     PMHx of HLD.  PSHx of TURP  FHx of prostate cancer (Brother)  Non-smoker    Past medical, surgical, family and social history in the chart was reviewed and is accurate including any additions to what is in this HPI.    Review of Systems   Constitutional: denies any unintentional weight loss or change in strength.  Integumentary: denies any rashes or pruritus.  Eyes: denies any double vision or eye pain.  Ear/Nose/Mouth/Throat: denies any nosebleeds or gum bleeds.  Cardiovascular: denies any chest pain or syncope.  Respiratory: denies hemoptysis.  Gastrointestinal: denies nausea or vomiting.  Musculoskeletal: denies muscle cramping or weakness.  Neurologic: denies convulsions or seizures.  Hematologic/Lymphatic: denies bleeding tendencies.  Endocrine: denies heat/cold intolerance.  All other systems have been reviewed and are negative unless otherwise noted in the HPI.    OBJECTIVE:  Visit Vitals  /76   Pulse 79   Temp 35.8 °C (96.5 °F)     Physical Exam   Constitutional: No obvious distress.  Eyes: Non-injected conjunctiva, sclera  clear, EOMI.  Ears/Nose/Mouth/Throat: No obvious drainage per ears or nose.  Cardiovascular: Extremities are warm and well perfused. No edema, cyanosis or pallor.  Respiratory: No audible wheezing/stridor; respirations do not appear labored.  Gastrointestinal: Abdomen soft, not distended.  Musculoskeletal: Normal ROM of extremities.  Skin: No obvious rashes or open sores.  Neurologic: Alert and oriented, CN 2-12 grossly intact.  Psychiatric: Answers questions appropriately with normal affect.  Hematologic/Lymphatic/Immunologic: No obvious bruises or sites of spontaneous bleeding.  Genitourinary: No CVA tenderness, bladder not palpable.     Labs and imaging:  Lab Results   Component Value Date    WBC 6.7 09/05/2023    HGB 14.8 09/05/2023    HCT 45.0 09/05/2023     09/05/2023     09/05/2023    K 4.5 09/05/2023     09/05/2023    CREATININE 0.83 09/05/2023    BUN 14 09/05/2023    CO2 28 09/05/2023    INR 1.0 09/05/2023     ASSESSMENT:  Problem List Items Addressed This Visit       Urethral stricture     Other Visit Diagnoses       Weak urinary stream    -  Primary    Relevant Orders    POCT UA Automated manually resulted (Completed)    Follow Up In Urology     PLAN:  Doing well overall s/p recent prostate biopsy with Dr. Forman (10/17/23).  Pathology results still pending. Sutures recently removed.  Pain has been well-controlled with no significant redness, swelling or discharge.  Recommended to continue to follow up with Dr. Forman for his pathology results.    We discussed the natural history of urethral stricture disease in detail, including the high likelihood of recurrence given two or more previous endoscopic procedures. We discussed management options including ISC, repeat dilation in the OR or office, or urethroplasty.     S/p distal urethroplasty (08/09/23)  Reports his obstructive urinary symptoms have remained stable since.  Encouraged to call if he develops a UTI or any acute // worsening  urinary issues.   Otherwise, plan to follow up annually for reassessment.    All questions were answered to the patient’s satisfaction.  Patient agrees with the plan and wishes to proceed.    Scribed for Dr. Rubens Mckeon by Yefri Henderson.  I, Dr. Rubens Mckeon have personally reviewed and agreed with the information entered by the Virtual Scribe. 10/25/23.

## 2023-10-31 LAB
LAB AP ASR DISCLAIMER: NORMAL
LABORATORY COMMENT REPORT: NORMAL
PATH REPORT.FINAL DX SPEC: NORMAL
PATH REPORT.GROSS SPEC: NORMAL
PATH REPORT.TOTAL CANCER: NORMAL

## 2023-11-02 ENCOUNTER — TELEMEDICINE (OUTPATIENT)
Dept: UROLOGY | Facility: CLINIC | Age: 73
End: 2023-11-02
Payer: MEDICARE

## 2023-11-02 DIAGNOSIS — C61 PROSTATE CANCER (MULTI): Primary | ICD-10-CM

## 2023-11-02 PROCEDURE — 99214 OFFICE O/P EST MOD 30 MIN: CPT | Performed by: STUDENT IN AN ORGANIZED HEALTH CARE EDUCATION/TRAINING PROGRAM

## 2023-11-02 NOTE — PROGRESS NOTES
HPI:  Proc (10/17/23): TP prostate biopsy   Path: prostatic adenocarcinoma, ABI #1 GG1 (Rosetta score 3+3=6) , 2/multi cores (15% of tissue)    Reji Lopez is a 73 y.o. male referred by Dr. Carrasquillo with prostate cancer. Hx of BPH w/ LUTS, kidney stones, bladder stone s/p cystolitholapaxy and TURP. MRI Prostate (9/8/23) showed a PI-RADS 4 lesion in the left to apical PZ, no pelvic lymphadenopathy or extraprostatic extension, PI-RADS 2 changes in the PZ, likely postprocedural. S/p TP prostate biopsy (10/17/23) with pathology showing prostatic adenocarcinoma, ABI #1 GG1 (Rosetta score 3+3=6) , 2/multi cores (15% of tissue). S/p biopsy reports testicle pain, now resolved. Good urinary function. Doing well.     Non-smoker     PSA: 0.62 (9/22)    MRI Prostate (9/8/23): 7.8g   PI-RADS 4 lesion in the left to apical PZ, no pelvic lymphadenopathy or extraprostatic extension, PI-RADS 2 changes in the PZ, likely postprocedural.    Review of Systems:  All systems reviewed. Anything negative noted in the HPI.    Physical Exam:  Virtual visit.     Assessment/Plan   Reji Lopez is a 73 y.o. male referred by Dr. Carrasquillo with prostate cancer. Hx of BPH w/ LUTS, kidney stones, bladder stone s/p cystolitholapaxy and TURP. MRI Prostate (9/8/23) showed a PI-RADS 4 lesion in the left to apical PZ, no pelvic lymphadenopathy or extraprostatic extension, PI-RADS 2 changes in the PZ, likely postprocedural. S/p TP prostate biopsy (10/17/23) with pathology showing prostatic adenocarcinoma, ABI #1 GG1 (Branch score 3+3=6) , 2/multi cores (15% of tissue). Good urinary function. Doing well. Management options including risks, benefits and alternatives discussed at length and all questions answered. Patient prefers to proceed with PSA in a 2mos, then follow-up in 6mos with repeat PSA. Assuming PSA is stable, will repeat MRI Prostate and TP prostate biopsy in 1 year.              By signing my name below, I, Romel Gutierrez, attest that  this documentation has been prepared under the direction and in the presence of Dr. Alec Forman.  All medical record entries made by the Scribe were at my direction and personally dictated by me. I have reviewed the chart and agree that the record accurately reflects my personal performance of the history, physical exam, discussion and plan.

## 2023-11-16 ENCOUNTER — APPOINTMENT (OUTPATIENT)
Dept: UROLOGY | Facility: CLINIC | Age: 73
End: 2023-11-16
Payer: MEDICARE

## 2023-12-21 ENCOUNTER — LAB (OUTPATIENT)
Dept: LAB | Facility: LAB | Age: 73
End: 2023-12-21
Payer: MEDICARE

## 2023-12-21 DIAGNOSIS — C61 PROSTATE CANCER (MULTI): ICD-10-CM

## 2023-12-21 LAB — PSA SERPL-MCNC: 0.37 NG/ML

## 2023-12-21 PROCEDURE — 36415 COLL VENOUS BLD VENIPUNCTURE: CPT

## 2023-12-21 PROCEDURE — 84153 ASSAY OF PSA TOTAL: CPT

## 2024-02-22 ENCOUNTER — OFFICE VISIT (OUTPATIENT)
Dept: UROLOGY | Facility: CLINIC | Age: 74
End: 2024-02-22
Payer: MEDICARE

## 2024-02-22 VITALS — HEART RATE: 106 BPM | SYSTOLIC BLOOD PRESSURE: 134 MMHG | DIASTOLIC BLOOD PRESSURE: 85 MMHG | TEMPERATURE: 97.2 F

## 2024-02-22 DIAGNOSIS — R39.9 UTI SYMPTOMS: ICD-10-CM

## 2024-02-22 DIAGNOSIS — R39.12 WEAK URINARY STREAM: Primary | ICD-10-CM

## 2024-02-22 LAB
POC APPEARANCE, URINE: CLEAR
POC BILIRUBIN, URINE: NEGATIVE
POC BLOOD, URINE: NEGATIVE
POC COLOR, URINE: YELLOW
POC GLUCOSE, URINE: NEGATIVE MG/DL
POC KETONES, URINE: NEGATIVE MG/DL
POC LEUKOCYTES, URINE: NEGATIVE
POC NITRITE,URINE: NEGATIVE
POC PH, URINE: 8.5 PH
POC PROTEIN, URINE: NEGATIVE MG/DL
POC SPECIFIC GRAVITY, URINE: 1.01
POC UROBILINOGEN, URINE: 0.2 EU/DL

## 2024-02-22 PROCEDURE — 81003 URINALYSIS AUTO W/O SCOPE: CPT | Performed by: NURSE PRACTITIONER

## 2024-02-22 PROCEDURE — 99213 OFFICE O/P EST LOW 20 MIN: CPT | Performed by: NURSE PRACTITIONER

## 2024-02-22 PROCEDURE — 1126F AMNT PAIN NOTED NONE PRSNT: CPT | Performed by: NURSE PRACTITIONER

## 2024-02-22 PROCEDURE — 1036F TOBACCO NON-USER: CPT | Performed by: NURSE PRACTITIONER

## 2024-02-22 PROCEDURE — 1159F MED LIST DOCD IN RCRD: CPT | Performed by: NURSE PRACTITIONER

## 2024-02-22 NOTE — PROGRESS NOTES
Pt called stating he had Distal urethroplasty 9/14/23  and his incision on penis have not healed closed . It is having pain to touch and pain while urinating.  Nurse put in order for urine culture and reaching out to CNPs to  have pt seen in clinic to assess . Dr Mckeon in surgery rest of week . Dr Mckeon to be alerted

## 2024-02-22 NOTE — PROGRESS NOTES
"Subjective   Patient ID: Reji Lopez is a 73 y.o. male who presents for No chief complaint on file..  Complex urinary history. Review previous notes. S/p distal urethroplasty in fall 2023 with Dr. Mckeon. Increasing amounts of dysuria over the last few days. He felt that when he went to urinate this AM the incision \"burst open.\" He states he has not looked in this area in several months.         Review of Systems    Objective   Physical Exam  Vitals reviewed.   Genitourinary:     Penis: Circumcised.       Comments: Urethral meatus with mild hypospadias noted. No s/s of infection present    Alert and oriented x3  Moist mucous membranes  Breathes easily on room air  Abdomen soft, nondistended  No edema  No scleral icterus  No focal neurological deficits  Appears stated age, no acute distress    Office Visit on 02/22/2024   Component Date Value Ref Range Status    POC Color, Urine 02/22/2024 Yellow  Straw, Yellow, Light-Yellow Final    POC Appearance, Urine 02/22/2024 Clear  Clear Final    POC Glucose, Urine 02/22/2024 NEGATIVE  NEGATIVE mg/dl Final    POC Bilirubin, Urine 02/22/2024 NEGATIVE  NEGATIVE Final    POC Ketones, Urine 02/22/2024 NEGATIVE  NEGATIVE mg/dl Final    POC Specific Gravity, Urine 02/22/2024 1.015  1.005 - 1.035 Final    POC Blood, Urine 02/22/2024 NEGATIVE  NEGATIVE Final    POC PH, Urine 02/22/2024 8.5  No Reference Range Established PH Final    POC Protein, Urine 02/22/2024 NEGATIVE  NEGATIVE, 30 (1+) mg/dl Final    POC Urobilinogen, Urine 02/22/2024 0.2  0.2, 1.0 EU/DL Final    Poc Nitrite, Urine 02/22/2024 NEGATIVE  NEGATIVE Final    POC Leukocytes, Urine 02/22/2024 NEGATIVE  NEGATIVE Final        Assessment/Plan   Diagnoses and all orders for this visit:  Weak urinary stream  -     POCT UA Automated manually resulted    Given reassurance regarding symptoms. Plan for follow up with Dr. Mckeon in 2-3 months.        Anh Levin, APRN-CNP 02/22/24 3:08 PM   " [facility] ACUTE PAIN SERVICE    ([facility], [facility], [facility], [facility], [facility])  Pain Consult    Assessment/Plan:  Nidia Acosta is a 63 year old female who was admitted on 12/5/2023.  Pain Service is asked to see the patient for right hip pain, intractable abdominal pain.  Admitted for evaluation of right hip pain.  .   History of hypothyroidism, asthma, seasonal allergy, depression, insomnia presented with right lower quadrant pain with radiation to the right hip and back.     Per chart review patient hospitalized 12/2/ to 12/4 for intracatable low back pain.  She was seen by Acute Pain Service during that hospitalization.  Returned to ED within 24 hours.  Patient had colonoscopy on 11/24 with symptoms developing over the next day.  Pain with combination fo cramping burning stabbing quality.   CT abdomen/pelvis no acute abnormalities.   MRI of the lumbar spine showed L4-L5, L5-S1 facet arthropathy with periarticular edema or enhancement.  Negative for discitis or osteomyelitis.   Neurosurgery Consulted:  pain improved with pain primarily in right hip. Plan for right hip MRI found to have right labral tear.  Received cortisone injection and dismissed from hospital.    Ortho Consulted 12/6/23:  MRI showing mild degenerative changes and chronic labral tear.  No recommendation for surgery.  Cortisone injection could still provide some longer term relief once it kicks in this could take 3-7 days.  Pain control and add Robaxin   Follow up with arthritis surgeon at [place] 1 week after discharge.      Over the past 12 hours Nidia has received:  1(1mg) 3(0.5mg) and 2(5mg) oxycodone for MME of around 65 MME     shows one opioid prescription over this past year.   12/4/23 oxycodone 5 mg tablets 12 for 3 days    Describes pain as under better control now.   Pain became out of control at home after lidocaine injection wore off.  Hopeful after speaking with Ortho will continue to have some  benefit from the Steroid injection she received a few days ago.  She has required IV hydromorphone and oxycodone with addition of Robaxin to get pain under control.      Patient is hoping to be able to dismiss from hospital with medication adjustments.        PLAN:  Acute pain secondary to labral tear, acute flare of chronic pain.  Multimodal Medication Therapy:   Adjuvants: acetaminophen 975 mg tid, question if NSAIDs would be okay? , gabapentin 200 mg bid, Methocarbamol 500 mg qid prn  Opioids:  oxycodone 5-10 mg every four hours prn (maximum dose of 8 tablets/day) Non-medication interventions- Ice  Constipation Prophylaxis- daily senna/docusate  Follow up /Discharge Recommendations - We recommend prescribing the following at the time of discharge:  Robaxin 500 mg every 6 hours for 4 days then four times daily prn  Tylenol extra strength tid  Gabapentin 200 mg bid for 2 weeks           Subjective:  Pain improved, increases with movement.  Has been able to tolerate activity if careful with mobility uses support.            Hip pain, right   Patient Active Problem List   Diagnosis    Herpes simplex infection of genitourinary system    Disturbance in sleep behavior    Hypothyroidism due to acquired atrophy of thyroid    Seasonal allergic rhinitis, unspecified trigger    Obesity    Subdural hemorrhage (H)    Mild major depression (H)    Hyperlipidemia LDL goal <130    Insomnia    H/O: hysterectomy    POD (perioral dermatitis)    PTSD (post-traumatic stress disorder)    BMI 38.0-38.9,adult    Obesity (BMI 35.0-39.9) with comorbidity (H)    Cough variant asthma    Recurrent major depression in complete remission (H24)    Left foot pain    Injury of left ankle, subsequent encounter    Intractable low back pain    Sinusitis    Mild intermittent asthma without complication    Hip pain, right        History   Drug Use No         Tobacco Use      Smoking status: Former        Packs/day: 0.50        Years: 1.00         "Additional pack years: 0.00        Total pack years: 0.50        Types: Cigarettes        Quit date: 1986        Years since quittin.9      Smokeless tobacco: Never         cetirizine  10 mg Oral Daily    enoxaparin ANTICOAGULANT  40 mg Subcutaneous Daily    FLUoxetine  40 mg Oral Daily    gabapentin  200 mg Oral BID    levothyroxine  125 mcg Oral QAM AC    montelukast  10 mg Oral At Bedtime    valACYclovir  500 mg Oral Daily       Objective:  Vital signs in last 24 hours:  B/P: 154/70, T: 98.5, P: 65, R: 16   Blood pressure (!) 154/70, pulse 65, temperature 98.5  F (36.9  C), temperature source Oral, resp. rate 16, height 1.549 m (5' 1\"), last menstrual period 2002, SpO2 97%, not currently breastfeeding.      Weight:   Wt Readings from Last 2 Encounters:   23 90.7 kg (200 lb)   23 91.6 kg (202 lb)         Intake/Output:    Intake/Output Summary (Last 24 hours) at 2023 1028  Last data filed at 2023 0800  Gross per 24 hour   Intake 118 ml   Output --   Net 118 ml        Review of Systems:   As per subjective, all others negative.    Physical Exam:     General Appearance:  Alert, cooperative, no distress, appears stated age   Patient is resting in darkened room   Head:  Normocephalic, without obvious abnormality, atraumatic   Eyes:  PERRL, conjunctiva/corneas clear, EOM's intact   Lymph/Neck: Supple, symmetrical, trachea midline   Lungs:   respirations unlabored   Chest Wall:  No tenderness or deformity   Abdomen:   Soft, non-tender, non distended   Musculoskeletal: Extremities normal, atraumatic     Skin: Skin is intact    Neurologic: Alert and oriented X 3, Moves all 4 extremities         Imaging:  Personally Reviewed.    Results for orders placed or performed during the hospital encounter of 23   MR Hip Right w/o & w Contrast    Impression    IMPRESSION:  1.  Trace right hip joint fluid, likely related to recent joint injection. No evidence of synovitis.    2.  No acute " fracture or dislocation.        Lab Results:  Personally Reviewed.   Last Comprehensive Metabolic Panel:  Sodium   Date Value Ref Range Status   12/06/2023 136 135 - 145 mmol/L Final     Comment:     Reference intervals for this test were updated on 09/26/2023 to more accurately reflect our healthy population. There may be differences in the flagging of prior results with similar values performed with this method. Interpretation of those prior results can be made in the context of the updated reference intervals.    08/05/2019 141 133 - 144 mmol/L Final     Potassium   Date Value Ref Range Status   12/06/2023 5.3 3.4 - 5.3 mmol/L Final   09/27/2021 4.1 3.4 - 5.3 mmol/L Final     Comment:     Specimen slightly hemolyzed, potassium may be falsely elevated.   08/05/2019 3.9 3.4 - 5.3 mmol/L Final     Chloride   Date Value Ref Range Status   12/06/2023 103 98 - 107 mmol/L Final   09/27/2021 108 94 - 109 mmol/L Final   08/05/2019 107 94 - 109 mmol/L Final     Carbon Dioxide   Date Value Ref Range Status   08/05/2019 27 20 - 32 mmol/L Final     Carbon Dioxide (CO2)   Date Value Ref Range Status   12/06/2023 27 22 - 29 mmol/L Final   09/27/2021 24 20 - 32 mmol/L Final     Anion Gap   Date Value Ref Range Status   12/06/2023 6 (L) 7 - 15 mmol/L Final   09/27/2021 6 3 - 14 mmol/L Final   08/05/2019 7 3 - 14 mmol/L Final     Glucose   Date Value Ref Range Status   12/06/2023 127 (H) 70 - 99 mg/dL Final   09/27/2021 90 70 - 99 mg/dL Final   08/05/2019 98 70 - 99 mg/dL Final     Urea Nitrogen   Date Value Ref Range Status   12/06/2023 11.5 8.0 - 23.0 mg/dL Final   09/27/2021 11 7 - 30 mg/dL Final   08/05/2019 18 7 - 30 mg/dL Final     Creatinine   Date Value Ref Range Status   12/06/2023 0.63 0.51 - 0.95 mg/dL Final   08/05/2019 0.87 0.52 - 1.04 mg/dL Final     GFR Estimate   Date Value Ref Range Status   12/06/2023 >90 >60 mL/min/1.73m2 Final   08/05/2019 74 >60 mL/min/[1.73_m2] Final     Comment:     Non  GFR  "Calc  Starting 12/18/2018, serum creatinine based estimated GFR (eGFR) will be   calculated using the Chronic Kidney Disease Epidemiology Collaboration   (CKD-EPI) equation.       Calcium   Date Value Ref Range Status   12/06/2023 9.8 8.8 - 10.2 mg/dL Final   08/05/2019 9.2 8.5 - 10.1 mg/dL Final        UA: No results found for: \"UAMP\", \"UBARB\", \"BENZODIAZEUR\", \"UCANN\", \"UCOC\", \"OPIT\", \"UPCP\"         MANAGEMENT DISCUSSED with the following over the past 24 hours: RN and MD   NOTE(S)/MEDICAL RECORDS REVIEWED over the past 24 hours: Pain Consult Notes, Hospital Medicine Notes, Ortho Notes, ED Physician notes  Medical complexity over the past 24 hours:  - Parenteral (IV) CONTROLLED SUBSTANCES ordered  - Prescription DRUG MANAGEMENT performed        ELOISE Benitez, DNP CNS  Acute Inpatient Pain Team  M-F   Paging via [employer] or [employer]         "

## 2024-04-10 ENCOUNTER — OFFICE VISIT (OUTPATIENT)
Dept: UROLOGY | Facility: CLINIC | Age: 74
End: 2024-04-10
Payer: MEDICARE

## 2024-04-10 VITALS
BODY MASS INDEX: 23.39 KG/M2 | HEART RATE: 71 BPM | TEMPERATURE: 97.9 F | DIASTOLIC BLOOD PRESSURE: 78 MMHG | SYSTOLIC BLOOD PRESSURE: 130 MMHG | WEIGHT: 163 LBS

## 2024-04-10 DIAGNOSIS — R35.0 BENIGN PROSTATIC HYPERPLASIA WITH URINARY FREQUENCY: Primary | ICD-10-CM

## 2024-04-10 DIAGNOSIS — N40.1 BENIGN PROSTATIC HYPERPLASIA WITH URINARY FREQUENCY: Primary | ICD-10-CM

## 2024-04-10 LAB
POC APPEARANCE, URINE: CLEAR
POC BILIRUBIN, URINE: NEGATIVE
POC BLOOD, URINE: NEGATIVE
POC COLOR, URINE: YELLOW
POC GLUCOSE, URINE: NEGATIVE MG/DL
POC KETONES, URINE: NEGATIVE MG/DL
POC LEUKOCYTES, URINE: NEGATIVE
POC NITRITE,URINE: NEGATIVE
POC PH, URINE: 7 PH
POC PROTEIN, URINE: NEGATIVE MG/DL
POC SPECIFIC GRAVITY, URINE: 1.02
POC UROBILINOGEN, URINE: 0.2 EU/DL

## 2024-04-10 PROCEDURE — 99213 OFFICE O/P EST LOW 20 MIN: CPT | Performed by: STUDENT IN AN ORGANIZED HEALTH CARE EDUCATION/TRAINING PROGRAM

## 2024-04-10 PROCEDURE — 1159F MED LIST DOCD IN RCRD: CPT | Performed by: STUDENT IN AN ORGANIZED HEALTH CARE EDUCATION/TRAINING PROGRAM

## 2024-04-10 PROCEDURE — 81003 URINALYSIS AUTO W/O SCOPE: CPT | Performed by: STUDENT IN AN ORGANIZED HEALTH CARE EDUCATION/TRAINING PROGRAM

## 2024-04-10 NOTE — PROGRESS NOTES
Scribed for Dr. Rubens Mckeon by Yefri Henderson. I, Dr. Rubens Mckeon have personally reviewed and agreed with the information entered by the Virtual Scribe. 04/10/24.    ASSESSMENT:  Problem List Items Addressed This Visit    None  Visit Diagnoses       Benign prostatic hyperplasia with urinary frequency    -  Primary    Relevant Orders    Post-Void Residual (Completed)    POCT UA Automated manually resulted (Completed)    Post-Void Residual          1. BPH - s/p TURP (May 2023)  2. Prostate cancer - GS 6, GG1  3. Meatal stenosis - s/p distal urethroplasty with me (08/09/23)  4. Bladder stones.     PLAN:  Reports he has been doing well overall.   Remains satisfied with results of urethroplasty.   Stream has been okay, and feels he empties well.   Denies any recent infections, gross hematuria, fevers, or chills.   IO urinalysis negative for blood or infection today.     RTC in 1 year for reassessment.    All questions were answered to the patient’s satisfaction.  Patient agrees with the plan and wishes to proceed.  Continue follow-up for ongoing care of his chronic medical conditions.       History of Present Illness (HPI):  Reji presents for a follow up visit.  The patient’s EMR has been reviewed.  Lives in [place], [place].    History of BPH s/p TURP, with incidental GS 6 prostate cancer, meatal stenosis, and bladder stones.   Previously seen by Dr. Carrasquillo and Dr. Forman.    Hx of bladder stones and BPH w/ LUTS.  S/p TURP and cystolitholapaxy (May 9th, 2023).  Post-operatively c/b meatal stenosis.  S/p dilation, however c/o persistent slowing, spraying and leakage.  Initially referred to me for management of his obstructive uropathy.  S/p distal urethroplasty with me (08/09/23). See below.     Hx of incidental Rosetta 6 prostate cancer on TURP specimen.  Since evaluated by Dr. Carrasquillo whom reviewed his latest prostate MRI.  Prostate MRI (09/08/23) showed a 7.8g prostate and PI-RADS 4 and PI-RADS 2.   No pelvic  lymphadenopathy or extraprostatic extension.  Referred and since followed up with Dr. Forman for biopsy.  Now s/p prostate biopsy (10/17/23);   pathology showed GS 6, GG1 adenocarcinoma in appx 15% of tissue.     TODAY: (04/10/24)  Reports he has been doing well overall.   Remains satisfied with results of urethroplasty.   Stream has been okay, and feels he empties well.   Denies any recent infections, gross hematuria, fevers, or chills.   IO urinalysis negative for blood or infection today.     Uroflow results poorly diagnostic:   Showed a prolonged curve.  Qmax: 3 mL/s  VV: 22 mL    TO REVIEW: Last visit (10/25/23)  Doing well overall s/p recent prostate biopsy with Dr. Forman (10/17/23).  Pathology results still pending. Sutures recently removed.  Pain has been well-controlled with no significant redness, swelling or discharge.     Albeit, c/o persistent sensation of incomplete emptying.  Associated with mild dysuria (burning) at the end of his voids.  Otherwise his stream has remained stable since last dilation.  Denies any significant straining to void.      States he voided shortly before arriving today, hence uroflow results.  Otherwise denies any recent UTIs, gross hematuria, fevers or chills.     Uroflow results:  Qmax: 4 mL/s,  VV: 42 mL, suboptimal     TO REVIEW: Last evaluation (08/09/23)  Presents today for cysto//RUG +/- dilation.  A glidewire was passed and we sequentially dilated the meatus with S-curve dilators. Dilated to 20 Fr, meatus only.     Cystoscopy demonstrated:  Urethra: normal in course and caliber with no evidence of other stricture or lesion.  Prostate: nonobstructing.  Bladder: normal capacity, no trabeculation, no diverticulum, no stone, tumors, or other lesions.     RUG performed, meatus allowed the adaptor tip easily.  Contrast was instilled per urethra with fluoroscopic spot images obtained  The urethra was normal course and caliber until contrast abruptly did not pass further beyond  the level of the sphincter  Contrast did not make it into the bladder, but we later confirmed there was no stricture here on cystoscopy     TO REVIEW: Initial evaluation (07/24/23)  Reports that about 5 weeks after TURP.  He developed gradual recurrence of his obstructive symptoms.  More recently, followed up at ED (06/20/23) for acute retention.  Requiring Rangel cath placement for a week.  Since removed, and able to void independently.  However his stream has been gradually weakening since.  WIth occasional leakage, wears precautionary pads.   Otherwise denies any recent UTIs, gross hematuria, flank pain, fevers or chills.     PMHx of HLD.  PSHx of TURP  FHx of prostate cancer (Brother)  Non-smoker    No past medical history on file.  No past surgical history on file.  No family history on file.  Social History     Tobacco Use   Smoking Status Never   Smokeless Tobacco Never     Current Outpatient Medications   Medication Sig Dispense Refill    atorvastatin (Lipitor) 20 mg tablet Take 1 tablet (20 mg) by mouth once daily.      venlafaxine XR (Effexor-XR) 75 mg 24 hr capsule Take 1 capsule (75 mg) by mouth once daily.       No current facility-administered medications for this visit.     No Known Allergies  Past medical, surgical, family and social history in the chart was reviewed and is accurate including any additions to what is in this HPI.    REVIEW OF SYSTEMS (ROS):   Constitutional: denies any unintentional weight loss or change in strength.  Integumentary: denies any rashes or pruritus.  Eyes: denies any double vision or eye pain.  Ear/Nose/Mouth/Throat: denies any nosebleeds or gum bleeds.  Cardiovascular: denies any chest pain or syncope.  Respiratory: denies hemoptysis.  Gastrointestinal: denies nausea or vomiting.  Musculoskeletal: denies muscle cramping or weakness.  Neurologic: denies convulsions or seizures.  Hematologic/Lymphatic: denies bleeding tendencies.  Endocrine: denies heat/cold  intolerance.  All other systems have been reviewed and are negative unless otherwise noted in the HPI.     OBJECTIVE:  Visit Vitals  /78   Pulse 71   Temp 36.6 °C (97.9 °F) (Temporal)     PHYSICAL EXAM:  Constitutional: No obvious distress.  Eyes: Non-injected conjunctiva, sclera clear, EOMI.  Ears/Nose/Mouth/Throat: No obvious drainage per ears or nose.  Cardiovascular: Extremities are warm and well perfused. No edema, cyanosis or pallor.  Respiratory: No audible wheezing/stridor; respirations do not appear labored.  Gastrointestinal: Abdomen soft, not distended.  Musculoskeletal: Normal ROM of extremities.  Skin: No obvious rashes or open sores.  Neurologic: Alert and oriented, CN 2-12 grossly intact.  Psychiatric: Answers questions appropriately with normal affect.  Hematologic/Lymphatic/Immunologic: No obvious bruises or sites of spontaneous bleeding.  Genitourinary: No CVA tenderness, bladder not palpable.     LABS & IMAGING:  Lab Results   Component Value Date    WBC 6.7 09/05/2023    HGB 14.8 09/05/2023    HCT 45.0 09/05/2023     09/05/2023     09/05/2023    K 4.5 09/05/2023     09/05/2023    CREATININE 0.83 09/05/2023    BUN 14 09/05/2023    CO2 28 09/05/2023    PSA 0.37 12/21/2023    INR 1.0 09/05/2023     Scribed for Dr. Rubens Mckeon by Yefri Henderson.  I, Dr. Rubens Mckeon have personally reviewed and agreed with the information entered by the Virtual Scribe. 04/10/24.

## 2024-04-17 ENCOUNTER — APPOINTMENT (OUTPATIENT)
Dept: UROLOGY | Facility: CLINIC | Age: 74
End: 2024-04-17
Payer: MEDICARE

## 2024-04-24 ENCOUNTER — LAB (OUTPATIENT)
Dept: LAB | Facility: LAB | Age: 74
End: 2024-04-24
Payer: MEDICARE

## 2024-04-24 DIAGNOSIS — C61 PROSTATE CANCER (MULTI): ICD-10-CM

## 2024-04-24 LAB — PSA SERPL-MCNC: 0.37 NG/ML

## 2024-04-24 PROCEDURE — 36415 COLL VENOUS BLD VENIPUNCTURE: CPT

## 2024-04-24 PROCEDURE — 84153 ASSAY OF PSA TOTAL: CPT

## 2024-05-02 ENCOUNTER — OFFICE VISIT (OUTPATIENT)
Dept: UROLOGY | Facility: CLINIC | Age: 74
End: 2024-05-02
Payer: MEDICARE

## 2024-05-02 VITALS — BODY MASS INDEX: 23.39 KG/M2 | TEMPERATURE: 96.4 F | WEIGHT: 163 LBS

## 2024-05-02 DIAGNOSIS — C61 PROSTATE CANCER (MULTI): Primary | ICD-10-CM

## 2024-05-02 PROCEDURE — 1126F AMNT PAIN NOTED NONE PRSNT: CPT | Performed by: STUDENT IN AN ORGANIZED HEALTH CARE EDUCATION/TRAINING PROGRAM

## 2024-05-02 PROCEDURE — 1159F MED LIST DOCD IN RCRD: CPT | Performed by: STUDENT IN AN ORGANIZED HEALTH CARE EDUCATION/TRAINING PROGRAM

## 2024-05-02 PROCEDURE — G2211 COMPLEX E/M VISIT ADD ON: HCPCS | Performed by: STUDENT IN AN ORGANIZED HEALTH CARE EDUCATION/TRAINING PROGRAM

## 2024-05-02 PROCEDURE — 99214 OFFICE O/P EST MOD 30 MIN: CPT | Performed by: STUDENT IN AN ORGANIZED HEALTH CARE EDUCATION/TRAINING PROGRAM

## 2024-05-02 ASSESSMENT — PAIN SCALES - GENERAL: PAINLEVEL: 0-NO PAIN

## 2024-05-02 NOTE — PROGRESS NOTES
HPI:  Proc (10/17/23): TP prostate biopsy   Path: prostatic adenocarcinoma, ABI #1 GG1 (Rosetta score 3+3=6) , 2/multi cores (15% of tissue)    Reji Lopez is a 73 y.o. male referred by Dr. Carrasquillo with prostate cancer. Hx of BPH w/ LUTS, kidney stones, bladder stone s/p cystolitholapaxy and TURP. MRI Prostate (9/8/23) showed a PI-RADS 4 lesion in the left to apical PZ, no pelvic lymphadenopathy or extraprostatic extension, PI-RADS 2 changes in the PZ, likely postprocedural. S/p TP prostate biopsy (10/17/23) with pathology showing prostatic adenocarcinoma, ABI #1 GG1 (Rosetta score 3+3=6) , 2/multi cores (15% of tissue). PSA 0.37 (4/24/24). Good urinary function.  Doing well.     Non-smoker     PSA: 0.37 (4/24/24), 0.62 (9/22)    MRI Prostate (9/8/23): 7.8g   PI-RADS 4 lesion in the left to apical PZ, no pelvic lymphadenopathy or extraprostatic extension, PI-RADS 2 changes in the PZ, likely postprocedural.    Review of Systems:  All systems reviewed. Anything negative noted in the HPI.    Physical Exam:  Vitals signs reviewed.  Constitutional:      Appearance: Well-developed.  HENT:     Head: Normocephalic and atraumatic.  Neck:     Musculoskeletal: Normal range of motion.  Pulmonary:     Effort: Pulmonary effort is normal.  Musculoskeletal: Normal range of motion.  Skin:     General: Skin is warm and dry.  Neurological:     Mental Status: Alert and oriented to person, place, and time.  Psychiatric:        Behavior: Behavior normal.        Thought Content: Thought content normal.        Judgment: Judgment normal.     Assessment/Plan   Reji Lopez is a 73 y.o. male referred by Dr. Carrasquillo with prostate cancer. Hx of BPH w/ LUTS, kidney stones, bladder stone s/p cystolitholapaxy and TURP. MRI Prostate (9/8/23) showed a PI-RADS 4 lesion in the left to apical PZ, no pelvic lymphadenopathy or extraprostatic extension, PI-RADS 2 changes in the PZ, likely postprocedural. S/p TP prostate biopsy (10/17/23) with pathology  showing prostatic adenocarcinoma, ABI #1 GG1 (Rosetta score 3+3=6) , 2/multi cores (15% of tissue). PSA 0.37 (4/24/24). Good urinary function.  Doing well. Management options including risks, benefits and alternatives discussed at length and all questions answered. Patient prefers to proceed with follow-up virtually in 6mos with a PSA and MRI Prostate.           Scribe Attestation  By signing my name below, ISudha Scribe   attest that this documentation has been prepared under the direction and in the presence of Alec Forman MD.

## 2024-10-30 ENCOUNTER — APPOINTMENT (OUTPATIENT)
Dept: UROLOGY | Facility: CLINIC | Age: 74
End: 2024-10-30
Payer: MEDICARE

## 2024-10-31 ENCOUNTER — LAB (OUTPATIENT)
Dept: LAB | Facility: LAB | Age: 74
End: 2024-10-31
Payer: MEDICARE

## 2024-10-31 ENCOUNTER — HOSPITAL ENCOUNTER (OUTPATIENT)
Dept: RADIOLOGY | Facility: CLINIC | Age: 74
Discharge: HOME | End: 2024-10-31
Payer: MEDICARE

## 2024-10-31 DIAGNOSIS — C61 PROSTATE CANCER (MULTI): ICD-10-CM

## 2024-10-31 LAB — PSA SERPL-MCNC: 0.45 NG/ML

## 2024-10-31 PROCEDURE — 72197 MRI PELVIS W/O & W/DYE: CPT | Performed by: RADIOLOGY

## 2024-10-31 PROCEDURE — 72197 MRI PELVIS W/O & W/DYE: CPT

## 2024-10-31 PROCEDURE — 36415 COLL VENOUS BLD VENIPUNCTURE: CPT

## 2024-10-31 PROCEDURE — A9575 INJ GADOTERATE MEGLUMI 0.1ML: HCPCS | Performed by: STUDENT IN AN ORGANIZED HEALTH CARE EDUCATION/TRAINING PROGRAM

## 2024-10-31 PROCEDURE — 84153 ASSAY OF PSA TOTAL: CPT

## 2024-10-31 PROCEDURE — 2550000001 HC RX 255 CONTRASTS: Performed by: STUDENT IN AN ORGANIZED HEALTH CARE EDUCATION/TRAINING PROGRAM

## 2024-10-31 PROCEDURE — 76498 UNLISTED MR PROCEDURE: CPT | Performed by: RADIOLOGY

## 2024-10-31 RX ORDER — GADOTERATE MEGLUMINE 376.9 MG/ML
0.2 INJECTION INTRAVENOUS
Status: COMPLETED | OUTPATIENT
Start: 2024-10-31 | End: 2024-10-31

## 2024-11-07 ENCOUNTER — APPOINTMENT (OUTPATIENT)
Dept: UROLOGY | Facility: CLINIC | Age: 74
End: 2024-11-07
Payer: MEDICARE

## 2024-11-07 DIAGNOSIS — C61 PROSTATE CANCER (MULTI): Primary | ICD-10-CM

## 2024-11-07 PROCEDURE — 99213 OFFICE O/P EST LOW 20 MIN: CPT | Performed by: STUDENT IN AN ORGANIZED HEALTH CARE EDUCATION/TRAINING PROGRAM

## 2024-11-07 PROCEDURE — G2211 COMPLEX E/M VISIT ADD ON: HCPCS | Performed by: STUDENT IN AN ORGANIZED HEALTH CARE EDUCATION/TRAINING PROGRAM

## 2024-11-07 NOTE — PROGRESS NOTES
HPI:  Proc (10/17/23): TP prostate biopsy   Path: prostatic adenocarcinoma, ABI #1 GG1 (Rosetta score 3+3=6), 2/multi cores (15% of tissue)    Reji Lopez is a 74 y.o. male referred by Dr. Carrasquillo with prostate cancer. Hx of BPH w/ LUTS, kidney stones, bladder stone s/p cystolitholapaxy and TURP. MRI Prostate (9/8/23) showed a PI-RADS 4 lesion in the left to apical PZ, no pelvic lymphadenopathy or extraprostatic extension, PI-RADS 2 changes in the PZ, likely postprocedural. S/p TP prostate biopsy (10/17/23) with pathology showing prostatic adenocarcinoma, ABI #1 GG1 (Rosetta score 3+3=6) , 2/multi cores (15% of tissue). MRI Prostate (10/31/24) showed 31g, a stable PI-RADS 4 lesion in the left posterolateral apical PZ, no gross extracapsular extension, no enlarged pelvic lymph nodes. Doing well.     Non-smoker     PSA: 0.45 (10/31/24), 0.37 (4/24/24), 0.62 (9/22)    MRI Prostate (9/8/23): 7.8g   PI-RADS 4 lesion in the left to apical PZ, no pelvic lymphadenopathy or extraprostatic extension, PI-RADS 2 changes in the PZ, likely postprocedural.    MRI Prostate (10/31/24): 31g, a stable PI-RADS 4 lesion in the left posterolateral apical PZ, no gross extracapsular extension, no enlarged pelvic lymph nodes.    Review of Systems:  All systems reviewed. Anything negative noted in the HPI.    Physical Exam:  Virtual visit.     Assessment/Plan   Reji Lopez is a 74 y.o. male referred by Dr. Carrasquillo with prostate cancer. Hx of BPH w/ LUTS, kidney stones, bladder stone s/p cystolitholapaxy and TURP. MRI Prostate (9/8/23) showed a PI-RADS 4 lesion in the left to apical PZ, no pelvic lymphadenopathy or extraprostatic extension, PI-RADS 2 changes in the PZ, likely postprocedural. S/p TP prostate biopsy (10/17/23) with pathology showing prostatic adenocarcinoma, ABI #1 GG1 (Russell score 3+3=6) , 2/multi cores (15% of tissue). MRI Prostate (10/31/24) showed 31g, a stable PI-RADS 4 lesion in the left posterolateral apical PZ, no  gross extracapsular extension, no enlarged pelvic lymph nodes. Doing well. Management options including risks, benefits and alternatives discussed at length and all questions answered. Patient prefers to proceed with follow-up in 6mos with PSA.           Scribe Attestation  By signing my name below, Sudha LOCKWOOD Scribe   attest that this documentation has been prepared under the direction and in the presence of Alec Forman MD.

## 2024-11-13 ENCOUNTER — APPOINTMENT (OUTPATIENT)
Dept: UROLOGY | Facility: CLINIC | Age: 74
End: 2024-11-13
Payer: MEDICARE

## 2024-11-13 VITALS
HEART RATE: 80 BPM | TEMPERATURE: 97.2 F | SYSTOLIC BLOOD PRESSURE: 121 MMHG | DIASTOLIC BLOOD PRESSURE: 78 MMHG | BODY MASS INDEX: 22.56 KG/M2 | WEIGHT: 157.2 LBS

## 2024-11-13 DIAGNOSIS — N40.1 BENIGN PROSTATIC HYPERPLASIA WITH URINARY FREQUENCY: ICD-10-CM

## 2024-11-13 DIAGNOSIS — R35.0 BENIGN PROSTATIC HYPERPLASIA WITH URINARY FREQUENCY: ICD-10-CM

## 2024-11-13 LAB
POC APPEARANCE, URINE: CLEAR
POC BILIRUBIN, URINE: NEGATIVE
POC BLOOD, URINE: NEGATIVE
POC COLOR, URINE: YELLOW
POC GLUCOSE, URINE: NEGATIVE MG/DL
POC KETONES, URINE: NEGATIVE MG/DL
POC LEUKOCYTES, URINE: NEGATIVE
POC NITRITE,URINE: NEGATIVE
POC PH, URINE: 8 PH
POC PROTEIN, URINE: NEGATIVE MG/DL
POC SPECIFIC GRAVITY, URINE: 1.02
POC UROBILINOGEN, URINE: 0.2 EU/DL

## 2024-11-13 PROCEDURE — G2211 COMPLEX E/M VISIT ADD ON: HCPCS | Performed by: STUDENT IN AN ORGANIZED HEALTH CARE EDUCATION/TRAINING PROGRAM

## 2024-11-13 PROCEDURE — 81003 URINALYSIS AUTO W/O SCOPE: CPT | Performed by: STUDENT IN AN ORGANIZED HEALTH CARE EDUCATION/TRAINING PROGRAM

## 2024-11-13 PROCEDURE — 99213 OFFICE O/P EST LOW 20 MIN: CPT | Performed by: STUDENT IN AN ORGANIZED HEALTH CARE EDUCATION/TRAINING PROGRAM

## 2024-11-13 PROCEDURE — 1159F MED LIST DOCD IN RCRD: CPT | Performed by: STUDENT IN AN ORGANIZED HEALTH CARE EDUCATION/TRAINING PROGRAM

## 2024-11-13 ASSESSMENT — PATIENT HEALTH QUESTIONNAIRE - PHQ9
2. FEELING DOWN, DEPRESSED OR HOPELESS: NOT AT ALL
SUM OF ALL RESPONSES TO PHQ9 QUESTIONS 1 AND 2: 0
1. LITTLE INTEREST OR PLEASURE IN DOING THINGS: NOT AT ALL

## 2024-11-13 ASSESSMENT — ENCOUNTER SYMPTOMS
DEPRESSION: 0
OCCASIONAL FEELINGS OF UNSTEADINESS: 0
LOSS OF SENSATION IN FEET: 0

## 2024-11-13 NOTE — PROGRESS NOTES
Scribed for Dr. Rubens Mckeon by Yefri Henderson. I, Dr. Rubens Mckeon have personally reviewed and agreed with the information entered by the Virtual Scribe. 11/13/24.    ASSESSMENT:  Problem List Items Addressed This Visit    None  Visit Diagnoses       Benign prostatic hyperplasia with urinary frequency        Relevant Orders    POCT UA Automated manually resulted (Completed)    Post-Void Residual (Completed)          1. BPH - s/p TURP (May 2023)  2. Prostate cancer - GS 6, GG1  3. Meatal stenosis - s/p distal urethroplasty with me (08/09/23)  4. Bladder stones.     PLAN:  #Meatal stenosis  Reports he has been doing well overall.   Remains satisfied with results of urethroplasty.   Stream has been okay, and feels he empties well.   Denies any recent infections, gross hematuria, fevers, or chills.   IO urinalysis negative for blood or infection today.     RTC in 1 year for reassessment.    #PF dysfunction  Reports intermittent  discomfort.   Suspect PF dysfunction, and offered referral to PFPT.   He would like to research PF dysfunction prior to starting.   He will call and follow up if he would like to proceed with this.     All questions were answered to the patient’s satisfaction.  Patient agrees with the plan and wishes to proceed.  Continue follow-up for ongoing care of his chronic medical conditions.       History of Present Illness (HPI):  Reji presents for a follow up visit.  The patient’s EMR has been reviewed.  Lives in [place], [place].    History of BPH s/p TURP, with incidental GS 6 prostate cancer, meatal stenosis, and bladder stones.   Previously seen by Dr. Carrasquillo and Dr. Forman.    Hx of bladder stones and BPH w/ LUTS.  S/p TURP and cystolitholapaxy (May 9th, 2023).  Post-operatively c/b meatal stenosis.  S/p dilation, however c/o persistent slowing, spraying and leakage.  Initially referred to me for management of his obstructive uropathy.  S/p distal urethroplasty with me (08/09/23). See below.      Hx of incidental Rosetta 6 prostate cancer on TURP specimen.  Since evaluated by Dr. Carrasquillo whom reviewed his latest prostate MRI.  Prostate MRI (09/08/23) showed a 7.8g prostate and PI-RADS 4 and PI-RADS 2.   No pelvic lymphadenopathy or extraprostatic extension.  Referred and since followed up with Dr. Forman for biopsy.  Now s/p prostate biopsy (10/17/23);   pathology showed GS 6, GG1 adenocarcinoma in appx 15% of tissue.     TODAY: (11/13/24)  Reports he has been doing well overall.  Denies any bothersome obstructive urinary symptoms.   Experiences occasional discomfort with voiding and ejaculation; self-resolves.   Denies any recent UTI's, gross hematuria, fevers or chills.   Continues to follow up with Dr. Forman;  Who is monitoring his PSA closely.     TO REVIEW: Last visit (04/10/24)  Reports he has been doing well overall.   Remains satisfied with results of urethroplasty.   Stream has been okay, and feels he empties well.   Denies any recent infections, gross hematuria, fevers, or chills.   IO urinalysis negative for blood or infection today.     Uroflow results poorly diagnostic:   Showed a prolonged curve.  Qmax: 3 mL/s  VV: 22 mL    TO REVIEW: (10/25/23)  Doing well overall s/p recent prostate biopsy with Dr. Forman (10/17/23).  Pathology results still pending. Sutures recently removed.  Pain has been well-controlled with no significant redness, swelling or discharge.     Albeit, c/o persistent sensation of incomplete emptying.  Associated with mild dysuria (burning) at the end of his voids.  Otherwise his stream has remained stable since last dilation.  Denies any significant straining to void.      States he voided shortly before arriving today, hence uroflow results.  Otherwise denies any recent UTIs, gross hematuria, fevers or chills.     Uroflow results:  Qmax: 4 mL/s,  VV: 42 mL, suboptimal     TO REVIEW: (08/09/23)  Presents today for cysto//RUG +/- dilation.  A glidewire was passed and we  sequentially dilated the meatus with S-curve dilators. Dilated to 20 Fr, meatus only.     Cystoscopy demonstrated:  Urethra: normal in course and caliber with no evidence of other stricture or lesion.  Prostate: nonobstructing.  Bladder: normal capacity, no trabeculation, no diverticulum, no stone, tumors, or other lesions.     RUG performed, meatus allowed the adaptor tip easily.  Contrast was instilled per urethra with fluoroscopic spot images obtained  The urethra was normal course and caliber until contrast abruptly did not pass further beyond the level of the sphincter  Contrast did not make it into the bladder, but we later confirmed there was no stricture here on cystoscopy     TO REVIEW: Initial visit (07/24/23)  Reports that about 5 weeks after TURP.  He developed gradual recurrence of his obstructive symptoms.  More recently, followed up at ED (06/20/23) for acute retention.  Requiring Rangel cath placement for a week.  Since removed, and able to void independently.  However his stream has been gradually weakening since.  WIth occasional leakage, wears precautionary pads.   Otherwise denies any recent UTIs, gross hematuria, flank pain, fevers or chills.     PMHx of HLD.  PSHx of TURP  FHx of prostate cancer (Brother)  Non-smoker    No past medical history on file.  No past surgical history on file.  No family history on file.  Social History     Tobacco Use   Smoking Status Never   Smokeless Tobacco Never     Current Outpatient Medications   Medication Sig Dispense Refill    atorvastatin (Lipitor) 20 mg tablet Take 1 tablet (20 mg) by mouth once daily.      venlafaxine XR (Effexor-XR) 75 mg 24 hr capsule Take 1 capsule (75 mg) by mouth once daily.       No current facility-administered medications for this visit.     No Known Allergies  Past medical, surgical, family and social history in the chart was reviewed and is accurate including any additions to what is in this HPI.    REVIEW OF SYSTEMS (ROS):    Constitutional: denies any unintentional weight loss or change in strength.  Integumentary: denies any rashes or pruritus.  Eyes: denies any double vision or eye pain.  Ear/Nose/Mouth/Throat: denies any nosebleeds or gum bleeds.  Cardiovascular: denies any chest pain or syncope.  Respiratory: denies hemoptysis.  Gastrointestinal: denies nausea or vomiting.  Musculoskeletal: denies muscle cramping or weakness.  Neurologic: denies convulsions or seizures.  Hematologic/Lymphatic: denies bleeding tendencies.  Endocrine: denies heat/cold intolerance.  All other systems have been reviewed and are negative unless otherwise noted in the HPI.     OBJECTIVE:  There were no vitals taken for this visit.    PHYSICAL EXAM:  Constitutional: No obvious distress.  Eyes: Non-injected conjunctiva, sclera clear, EOMI.  Ears/Nose/Mouth/Throat: No obvious drainage per ears or nose.  Cardiovascular: Extremities are warm and well perfused. No edema, cyanosis or pallor.  Respiratory: No audible wheezing/stridor; respirations do not appear labored.  Gastrointestinal: Abdomen soft, not distended.  Musculoskeletal: Normal ROM of extremities.  Skin: No obvious rashes or open sores.  Neurologic: Alert and oriented, CN 2-12 grossly intact.  Psychiatric: Answers questions appropriately with normal affect.  Hematologic/Lymphatic/Immunologic: No obvious bruises or sites of spontaneous bleeding.  Genitourinary: No CVA tenderness, bladder not palpable.     LABS & IMAGING:  Lab Results   Component Value Date    WBC 6.7 09/05/2023    HGB 14.8 09/05/2023    HCT 45.0 09/05/2023     09/05/2023     09/05/2023    K 4.5 09/05/2023     09/05/2023    CREATININE 0.83 09/05/2023    BUN 14 09/05/2023    CO2 28 09/05/2023    PSA 0.45 10/31/2024    INR 1.0 09/05/2023     Scribed for Dr. Rubens Mckeon by Yefri Henderson.  I, Dr. Rubens Mckeon have personally reviewed and agreed with the information entered by the Virtual Scribe. 11/13/24.

## 2025-05-07 LAB — PSA SERPL-MCNC: 0.36 NG/ML

## 2025-05-15 ENCOUNTER — APPOINTMENT (OUTPATIENT)
Dept: UROLOGY | Facility: CLINIC | Age: 75
End: 2025-05-15
Payer: MEDICARE

## 2025-05-15 DIAGNOSIS — C61 PROSTATE CANCER (MULTI): Primary | ICD-10-CM

## 2025-05-15 PROCEDURE — 99213 OFFICE O/P EST LOW 20 MIN: CPT | Performed by: STUDENT IN AN ORGANIZED HEALTH CARE EDUCATION/TRAINING PROGRAM

## 2025-11-12 ENCOUNTER — APPOINTMENT (OUTPATIENT)
Dept: UROLOGY | Facility: CLINIC | Age: 75
End: 2025-11-12
Payer: MEDICARE

## 2026-05-21 ENCOUNTER — APPOINTMENT (OUTPATIENT)
Dept: UROLOGY | Facility: CLINIC | Age: 76
End: 2026-05-21
Payer: MEDICARE

## (undated) DEVICE — SOLUTION, IRRIGATION, X RX SODIUM CHL 0.9%, 1000ML BTL

## (undated) DEVICE — PERINEOLOGIC PRECISIONPOINT TRANSPERINEAL ACCESS, BIOPSY NEEDLE GUIDE (P1001)

## (undated) DEVICE — SYRINGE, 50 CC, IRRIGATION, CATHETER TIP, DISPOSABLE, STERILE, LF

## (undated) DEVICE — Device

## (undated) DEVICE — APPLICATOR, CHLORAPREP, W/ORANGE TINT, 26ML

## (undated) DEVICE — GOWN, SURGICAL, SIRUS, NON REINFORCED, LARGE

## (undated) DEVICE — DRESSING, NON-ADHERENT, CURAD, ABSORBENT, 3 X 8 IN, STERILE

## (undated) DEVICE — BLANKET, LOWER BODY, VHA PLUS, ADULT

## (undated) DEVICE — GLOVE, SURGICAL, PROTEXIS PI , 7.5, PF, LF